# Patient Record
Sex: FEMALE | Race: BLACK OR AFRICAN AMERICAN
[De-identification: names, ages, dates, MRNs, and addresses within clinical notes are randomized per-mention and may not be internally consistent; named-entity substitution may affect disease eponyms.]

---

## 2019-09-12 ENCOUNTER — HOSPITAL ENCOUNTER (EMERGENCY)
Dept: HOSPITAL 92 - ERS | Age: 54
Discharge: HOME | End: 2019-09-12
Payer: SELF-PAY

## 2019-09-12 DIAGNOSIS — Z79.899: ICD-10-CM

## 2019-09-12 DIAGNOSIS — Z86.711: ICD-10-CM

## 2019-09-12 DIAGNOSIS — R07.89: Primary | ICD-10-CM

## 2019-09-12 DIAGNOSIS — I11.0: ICD-10-CM

## 2019-09-12 DIAGNOSIS — I50.9: ICD-10-CM

## 2019-09-12 DIAGNOSIS — F17.210: ICD-10-CM

## 2019-09-12 LAB
ALBUMIN SERPL BCG-MCNC: 3.7 G/DL (ref 3.5–5)
ALP SERPL-CCNC: 73 U/L (ref 40–150)
ALT SERPL W P-5'-P-CCNC: 14 U/L (ref 8–55)
ANION GAP SERPL CALC-SCNC: 9 MMOL/L (ref 10–20)
AST SERPL-CCNC: 10 U/L (ref 5–34)
BASOPHILS # BLD AUTO: 0 THOU/UL (ref 0–0.2)
BASOPHILS NFR BLD AUTO: 0.8 % (ref 0–1)
BILIRUB SERPL-MCNC: 0.6 MG/DL (ref 0.2–1.2)
BUN SERPL-MCNC: 10 MG/DL (ref 9.8–20.1)
CALCIUM SERPL-MCNC: 8.9 MG/DL (ref 7.8–10.44)
CHLORIDE SERPL-SCNC: 100 MMOL/L (ref 98–107)
CO2 SERPL-SCNC: 36 MMOL/L (ref 22–29)
CREAT CL PREDICTED SERPL C-G-VRATE: 0 ML/MIN (ref 70–130)
EOSINOPHIL # BLD AUTO: 0.1 THOU/UL (ref 0–0.7)
EOSINOPHIL NFR BLD AUTO: 3 % (ref 0–10)
GLOBULIN SER CALC-MCNC: 2.3 G/DL (ref 2.4–3.5)
GLUCOSE SERPL-MCNC: 199 MG/DL (ref 70–105)
HGB BLD-MCNC: 15.3 G/DL (ref 12–16)
LIPASE SERPL-CCNC: 17 U/L (ref 8–78)
LYMPHOCYTES # BLD: 0.7 THOU/UL (ref 1.2–3.4)
LYMPHOCYTES NFR BLD AUTO: 14.7 % (ref 21–51)
MCH RBC QN AUTO: 29.2 PG (ref 27–31)
MCV RBC AUTO: 89.9 FL (ref 78–98)
MONOCYTES # BLD AUTO: 0.2 THOU/UL (ref 0.11–0.59)
MONOCYTES NFR BLD AUTO: 4.8 % (ref 0–10)
NEUTROPHILS # BLD AUTO: 3.4 THOU/UL (ref 1.4–6.5)
NEUTROPHILS NFR BLD AUTO: 76.8 % (ref 42–75)
PLATELET # BLD AUTO: 157 THOU/UL (ref 130–400)
POTASSIUM SERPL-SCNC: 4.2 MMOL/L (ref 3.5–5.1)
RBC # BLD AUTO: 5.23 MILL/UL (ref 4.2–5.4)
SODIUM SERPL-SCNC: 141 MMOL/L (ref 136–145)
WBC # BLD AUTO: 4.5 THOU/UL (ref 4.8–10.8)

## 2019-09-12 PROCEDURE — 83690 ASSAY OF LIPASE: CPT

## 2019-09-12 PROCEDURE — 36415 COLL VENOUS BLD VENIPUNCTURE: CPT

## 2019-09-12 PROCEDURE — 71275 CT ANGIOGRAPHY CHEST: CPT

## 2019-09-12 PROCEDURE — 80053 COMPREHEN METABOLIC PANEL: CPT

## 2019-09-12 PROCEDURE — 84484 ASSAY OF TROPONIN QUANT: CPT

## 2019-09-12 PROCEDURE — 93005 ELECTROCARDIOGRAM TRACING: CPT

## 2019-09-12 PROCEDURE — 85025 COMPLETE CBC W/AUTO DIFF WBC: CPT

## 2019-09-12 PROCEDURE — 83880 ASSAY OF NATRIURETIC PEPTIDE: CPT

## 2019-09-12 NOTE — CT
CTA Angio Chest W WO Con



HISTORY: Chest pain and shortness of breath.



COMPARISON: None.



FINDINGS: There are subsegmental atelectatic changes within the lung bases. No confluent infiltrative
 process or pulmonary nodules. Linear atelectasis is also seen in both upper lobes. No pleural

effusions.



The thoracic aorta is normal in caliber.



There is fairly good pulmonary artery opacification there is no CT evidence for pulmonary embolus.



There is no significant mediastinal or hilar adenopathy. The visualized liver parenchyma shows no foc
al findings. A low-attenuation left adrenal mass is again noted. It was seen on a previous 2006

exam most likely an adenoma. Measures approximately 4.6 cm.



IMPRESSION: No CT evidence for pulmonary embolus.



Reported By: Patricio Raines 

Electronically Signed:  9/12/2019 2:07 PM

## 2020-07-15 ENCOUNTER — HOSPITAL ENCOUNTER (INPATIENT)
Dept: HOSPITAL 92 - CCU | Age: 55
LOS: 4 days | Discharge: HOME | DRG: 208 | End: 2020-07-19
Attending: INTERNAL MEDICINE | Admitting: INTERNAL MEDICINE
Payer: COMMERCIAL

## 2020-07-15 VITALS — BODY MASS INDEX: 35.5 KG/M2

## 2020-07-15 DIAGNOSIS — F03.90: ICD-10-CM

## 2020-07-15 DIAGNOSIS — Z90.49: ICD-10-CM

## 2020-07-15 DIAGNOSIS — I08.1: ICD-10-CM

## 2020-07-15 DIAGNOSIS — I25.10: ICD-10-CM

## 2020-07-15 DIAGNOSIS — E11.9: ICD-10-CM

## 2020-07-15 DIAGNOSIS — I16.0: ICD-10-CM

## 2020-07-15 DIAGNOSIS — E87.4: ICD-10-CM

## 2020-07-15 DIAGNOSIS — G89.29: ICD-10-CM

## 2020-07-15 DIAGNOSIS — E87.6: ICD-10-CM

## 2020-07-15 DIAGNOSIS — E78.5: ICD-10-CM

## 2020-07-15 DIAGNOSIS — J96.22: ICD-10-CM

## 2020-07-15 DIAGNOSIS — J96.21: Primary | ICD-10-CM

## 2020-07-15 DIAGNOSIS — J18.9: ICD-10-CM

## 2020-07-15 DIAGNOSIS — J44.1: ICD-10-CM

## 2020-07-15 DIAGNOSIS — Z79.899: ICD-10-CM

## 2020-07-15 DIAGNOSIS — Z88.5: ICD-10-CM

## 2020-07-15 DIAGNOSIS — Z86.14: ICD-10-CM

## 2020-07-15 DIAGNOSIS — Z78.1: ICD-10-CM

## 2020-07-15 DIAGNOSIS — Z90.710: ICD-10-CM

## 2020-07-15 DIAGNOSIS — I50.9: ICD-10-CM

## 2020-07-15 DIAGNOSIS — Z20.828: ICD-10-CM

## 2020-07-15 DIAGNOSIS — J44.0: ICD-10-CM

## 2020-07-15 DIAGNOSIS — I11.0: ICD-10-CM

## 2020-07-15 DIAGNOSIS — E66.2: ICD-10-CM

## 2020-07-15 DIAGNOSIS — F12.10: ICD-10-CM

## 2020-07-15 DIAGNOSIS — F17.210: ICD-10-CM

## 2020-07-15 LAB
ALBUMIN SERPL BCG-MCNC: 3.1 G/DL (ref 3.5–5)
ALP SERPL-CCNC: 61 U/L (ref 40–110)
ALT SERPL W P-5'-P-CCNC: 15 U/L (ref 8–55)
ANALYZER IN CARDIO: (no result)
ANALYZER IN CARDIO: (no result)
ANION GAP SERPL CALC-SCNC: (no result) MMOL/L (ref 10–20)
AST SERPL-CCNC: 25 U/L (ref 5–34)
BASE EXCESS STD BLDA CALC-SCNC: 12.7 MEQ/L
BASE EXCESS STD BLDA CALC-SCNC: 15.1 MEQ/L
BILIRUB SERPL-MCNC: 0.9 MG/DL (ref 0.2–1.2)
BUN SERPL-MCNC: 9 MG/DL (ref 9.8–20.1)
CA-I BLDA-SCNC: 1.13 MMOL/L (ref 1.12–1.3)
CA-I BLDA-SCNC: 1.14 MMOL/L (ref 1.12–1.3)
CALCIUM SERPL-MCNC: 9.1 MG/DL (ref 7.8–10.44)
CHLORIDE SERPL-SCNC: 95 MMOL/L (ref 98–107)
CO2 SERPL-SCNC: (no result) MMOL/L (ref 22–29)
CREAT CL PREDICTED SERPL C-G-VRATE: 146 ML/MIN (ref 70–130)
GLOBULIN SER CALC-MCNC: 3.3 G/DL (ref 2.4–3.5)
GLUCOSE SERPL-MCNC: 151 MG/DL (ref 70–105)
HCO3 BLDA-SCNC: 38.2 MEQ/L (ref 22–28)
HCO3 BLDA-SCNC: 41.8 MEQ/L (ref 22–28)
HCT VFR BLDA CALC: 43 % (ref 36–47)
HCT VFR BLDA CALC: 48 % (ref 36–47)
HGB BLDA-MCNC: 14.6 G/DL (ref 12–16)
HGB BLDA-MCNC: 16.2 G/DL (ref 12–16)
O2 A-A PPRESDIFF RESPIRATORY: 496.73 MM[HG] (ref 0–20)
O2 A-A PPRESDIFF RESPIRATORY: 579.17 MM[HG] (ref 0–20)
PCO2 BLDA: 50.5 MMHG (ref 35–45)
PCO2 BLDA: 58.7 MMHG (ref 35–45)
PH BLDA: 7.47 [PH] (ref 7.35–7.45)
PH BLDA: 7.5 [PH] (ref 7.35–7.45)
PO2 BLDA: 70.7 MMHG (ref 80–100)
PO2 BLDA: 71.6 MMHG (ref 80–100)
POTASSIUM BLD-SCNC: 3.47 MMOL/L (ref 3.7–5.3)
POTASSIUM BLD-SCNC: 3.63 MMOL/L (ref 3.7–5.3)
POTASSIUM SERPL-SCNC: 4.4 MMOL/L (ref 3.5–5.1)
SODIUM SERPL-SCNC: 143 MMOL/L (ref 136–145)
SPECIMEN DRAWN FROM PATIENT: (no result)
SPECIMEN DRAWN FROM PATIENT: (no result)

## 2020-07-15 PROCEDURE — 82805 BLOOD GASES W/O2 SATURATION: CPT

## 2020-07-15 PROCEDURE — 36416 COLLJ CAPILLARY BLOOD SPEC: CPT

## 2020-07-15 PROCEDURE — 71045 X-RAY EXAM CHEST 1 VIEW: CPT

## 2020-07-15 PROCEDURE — 5A1945Z RESPIRATORY VENTILATION, 24-96 CONSECUTIVE HOURS: ICD-10-PCS | Performed by: INTERNAL MEDICINE

## 2020-07-15 PROCEDURE — 85379 FIBRIN DEGRADATION QUANT: CPT

## 2020-07-15 PROCEDURE — 83036 HEMOGLOBIN GLYCOSYLATED A1C: CPT

## 2020-07-15 PROCEDURE — U0003 INFECTIOUS AGENT DETECTION BY NUCLEIC ACID (DNA OR RNA); SEVERE ACUTE RESPIRATORY SYNDROME CORONAVIRUS 2 (SARS-COV-2) (CORONAVIRUS DISEASE [COVID-19]), AMPLIFIED PROBE TECHNIQUE, MAKING USE OF HIGH THROUGHPUT TECHNOLOGIES AS DESCRIBED BY CMS-2020-01-R: HCPCS

## 2020-07-15 PROCEDURE — S0028 INJECTION, FAMOTIDINE, 20 MG: HCPCS

## 2020-07-15 PROCEDURE — 85025 COMPLETE CBC W/AUTO DIFF WBC: CPT

## 2020-07-15 PROCEDURE — 8E0ZXY6 ISOLATION: ICD-10-PCS | Performed by: INTERNAL MEDICINE

## 2020-07-15 PROCEDURE — 87086 URINE CULTURE/COLONY COUNT: CPT

## 2020-07-15 PROCEDURE — 87040 BLOOD CULTURE FOR BACTERIA: CPT

## 2020-07-15 PROCEDURE — 84145 PROCALCITONIN (PCT): CPT

## 2020-07-15 PROCEDURE — 83880 ASSAY OF NATRIURETIC PEPTIDE: CPT

## 2020-07-15 PROCEDURE — 93306 TTE W/DOPPLER COMPLETE: CPT

## 2020-07-15 PROCEDURE — 80048 BASIC METABOLIC PNL TOTAL CA: CPT

## 2020-07-15 PROCEDURE — 94002 VENT MGMT INPAT INIT DAY: CPT

## 2020-07-15 PROCEDURE — 84443 ASSAY THYROID STIM HORMONE: CPT

## 2020-07-15 PROCEDURE — 80053 COMPREHEN METABOLIC PANEL: CPT

## 2020-07-15 PROCEDURE — 87635 SARS-COV-2 COVID-19 AMP PRB: CPT

## 2020-07-15 PROCEDURE — 94003 VENT MGMT INPAT SUBQ DAY: CPT

## 2020-07-15 PROCEDURE — 36415 COLL VENOUS BLD VENIPUNCTURE: CPT

## 2020-07-15 PROCEDURE — 86140 C-REACTIVE PROTEIN: CPT

## 2020-07-15 RX ADMIN — INSULIN LISPRO PRN UNIT: 100 INJECTION, SOLUTION INTRAVENOUS; SUBCUTANEOUS at 10:12

## 2020-07-15 RX ADMIN — INSULIN LISPRO PRN UNIT: 100 INJECTION, SOLUTION INTRAVENOUS; SUBCUTANEOUS at 17:13

## 2020-07-15 RX ADMIN — INSULIN LISPRO PRN UNIT: 100 INJECTION, SOLUTION INTRAVENOUS; SUBCUTANEOUS at 12:17

## 2020-07-15 RX ADMIN — ALBUTEROL SULFATE SCH PUFF: 108 INHALANT RESPIRATORY (INHALATION) at 23:34

## 2020-07-15 RX ADMIN — FAMOTIDINE SCH MG: 10 INJECTION, SOLUTION INTRAVENOUS at 19:31

## 2020-07-15 RX ADMIN — INSULIN LISPRO PRN UNIT: 100 INJECTION, SOLUTION INTRAVENOUS; SUBCUTANEOUS at 23:06

## 2020-07-15 RX ADMIN — ALBUTEROL SULFATE SCH PUFF: 108 INHALANT RESPIRATORY (INHALATION) at 22:11

## 2020-07-15 RX ADMIN — INSULIN LISPRO PRN UNIT: 100 INJECTION, SOLUTION INTRAVENOUS; SUBCUTANEOUS at 06:09

## 2020-07-15 RX ADMIN — AZITHROMYCIN SCH MLS: 500 INJECTION, POWDER, LYOPHILIZED, FOR SOLUTION INTRAVENOUS at 21:29

## 2020-07-15 RX ADMIN — CEFTRIAXONE SCH MLS: 2 INJECTION, POWDER, FOR SOLUTION INTRAMUSCULAR; INTRAVENOUS at 20:12

## 2020-07-15 RX ADMIN — FAMOTIDINE SCH MG: 10 INJECTION, SOLUTION INTRAVENOUS at 10:15

## 2020-07-15 NOTE — PDOC.HHP
Hospitalist HPI





- History of Present Illness


respiratoty distress


History of Present Illness: 


Most of the history was obtain from transferring physicians and EMR records. 

During my evaluation patient sedated and on mechanical ventilation. no family 

members were present at the moment of my evaluation





Case of an 53y/o female with pmhx of copd, dm, cad, chf, and htn  who was 

transferred from [facility] due to respiratory failure requiring mechanical 

ventilation. apparently patient was on her usual state of health until 

yesterday when she began with increasing dyspnea and cough, she reports using 

home therapies w/o resolution of her symptoms for which she decided to come to 

hospital for evaluation. patient arrived with an 02 sat of 73% in RA and was 

placed on bipap. abgs showed acute on chronic hypoxic and hypercapnic 

respiratory failure for which patient was placed on bipap. f/u abgs showed 

worsening of her respiratory status for which patient was placed on mechanical 

ventilation and transferred here for further evaluation and management. further 

work up showed b/l pneumonia concerning for viral pneumonia 








Hospitalist ROS





- Review of Systems


ROS unobtainable: due to endotracheal tube





Hospitalist History





- Past Surgical History


Past Surgical History: reports: Cholecystectomy, Hysterectomy





- Family History


Other Family History: 





unable to asses due to MV





- Social History


Smoking Status: Current every day smoker


Alcohol: reports: Occassional


Drugs: reports: marijuana


Living Situation: With Family





- Exam


General - other findings: sedated on MV


Eye: PERRL, anicteric sclera


ENT: normocephalic atraumatic, no oropharyngeal lesions


Neck: supple, symmetric, no JVD


Heart: RRR, no murmur, no gallops


Respiratory: CTAB, no wheezes, no rales


Gastrointestinal: soft, non-tender, non-distended


Extremities: no cyanosis, no clubbing, no edema


Skin: normal turgor, no lesions, no rashes


Neurological: cranial nerve grossly intact


Musculoskeletal: normal tone, no muscle wasting


Psychiatric - other findings: sedated





Hospitalist Results





- Labs


Lab results: 


 











ABG pH  7.50  (7.35-7.45)  H  07/15/20  03:20    


 


ABG pCO2  50.5 mmHg (35.0-45.0)  H  07/15/20  03:20    


 


ABG pO2  70.7 mmHg (80.0-100.0)  L  07/15/20  03:20    














- Radiology Interpretation


  ** Chest x-ray


Additional Comment: 





b/l pneumonia 





Hospitalist H&P A/P





- Problem


(1) Respiratory failure with hypoxia and hypercapnia


Code(s): J96.91 - RESPIRATORY FAILURE, UNSPECIFIED WITH HYPOXIA; J96.92 - 

RESPIRATORY FAILURE, UNSPECIFIED WITH HYPERCAPNIA   Status: Acute   





(2) Pneumonia


Code(s): J18.9 - PNEUMONIA, UNSPECIFIED ORGANISM   Status: Acute   





(3) COVID-19


Code(s): U07.1 - COVID-19   Status: Acute   





(4) COPD exacerbation


Code(s): J44.1 - CHRONIC OBSTRUCTIVE PULMONARY DISEASE W (ACUTE) EXACERBATION   

Status: Acute   





(5) HTN (hypertension)


Code(s): I10 - ESSENTIAL (PRIMARY) HYPERTENSION   Status: Acute   





(6) CHF (congestive heart failure)


Code(s): I50.9 - HEART FAILURE, UNSPECIFIED   Status: Acute   





(7) CAD (coronary artery disease)


Code(s): I25.10 - ATHSCL HEART DISEASE OF NATIVE CORONARY ARTERY W/O ANG PCTRS 

  Status: Acute   





(8) Diabetes


Code(s): E11.9 - TYPE 2 DIABETES MELLITUS WITHOUT COMPLICATIONS   Status: Acute

   





(9) Hypertensive urgency


Code(s): I16.0 - HYPERTENSIVE URGENCY   Status: Acute   





- Plan


Plan: 


53y/o female with the stated pmhx who presented with hypoxic and hypercapnic 

respiratory failure requiring MV in CS, transfer to further management. 





acute on chronic hypoxic and hypercapnic respiratory failure


- on MV f/u abgs


- pulmo / crit consulted 


- sedation protocol





pneumonia / covid 19 / copd exacerbation


- swab ordered


- isolatin protocol started


- on rocephin and azithromycin


- dexamethazone iv dailiy


- cxr w b/l pneumonia concerning for viral pneumonia


- has classic covid labs with lymphopenia


- will send inflammation marker


- ivfs


- f/u blood / urine cultures 





hypertensive urgency


- on cardene  drip due to systolic b/p in the 200s, transition to oral 

medicaion when possible 





DM


- accu checks and ss


- npo





htn / hyperlipidemia / cad / chf


- continue home meds when possible

## 2020-07-15 NOTE — CON
DATE OF CONSULTATION:  



HISTORY OF PRESENT ILLNESS:  A 54-year-old female, 90 kg, presented to the ER in

[place] with blood pressure 178/123, 56% sats on room air, pulse __________

severe respiratory distress.  She was eventually intubated and was transferred to

[facility]. 



She is presently sedated, unable to get additional information; but past medical

history is pertinent for previous back surgeries; MRSA infection, requiring

prolonged antibiotics, along with endocarditis; history of hypertension; pulmonary

emboli; COPD; tobacco abuse; and congestive heart failure. 



PAST SURGICAL HISTORY:  Hysterectomy, back surgery, and cholecystectomy.



SOCIAL HISTORY:  Alcohol, minimal.  Smokes marijuana, tobacco.



HOME MEDICATIONS:  Apparently include:

1. Metoprolol 100.

2. Spironolactone 25.



ALLERGIES:  CODEINE.



REVIEW OF SYSTEMS:  Unobtainable.



PHYSICAL EXAMINATION:

GENERAL:  She is sedated. 

VITAL SIGNS:  Blood pressure 136/96, pulse 109, sats 95%, respiratory rate 18. 

CHEST:  Rhonchi and crackles. 

CARDIAC:  Normal S1, S2.  No gallops. 

ABDOMEN:  No masses.



LABORATORY DATA:  White count was 6000, H and H 15 and 49, platelet count was

normal.  PO2 showed 71, pCO2 of 58, pH 7.47, bicarb was 41.  Lytes were normal. 



BNP was 292. 



X-ray shows diffuse pulmonary infiltrates, scoliosis.



IMPRESSION:  

1. Respiratory failure, pneumonia versus congestive heart failure.

2. Tobacco, history of marijuana abuse.

3. Chronic issues with back.



PLAN:  Awaiting serology for coronavirus.  Otherwise, agree with broad-spectrum

antibiotics, neb treatments, steroids, supportive care.  I ordered echo, DVT

prophylaxis, swab test, etc. 



45 minutes of critical care time.







Job ID:  [id number]

## 2020-07-16 LAB
ALBUMIN SERPL BCG-MCNC: 3.1 G/DL (ref 3.5–5)
ALP SERPL-CCNC: 57 U/L (ref 40–110)
ALT SERPL W P-5'-P-CCNC: 11 U/L (ref 8–55)
ANALYZER IN CARDIO: (no result)
ANION GAP SERPL CALC-SCNC: 18 MMOL/L (ref 10–20)
AST SERPL-CCNC: 23 U/L (ref 5–34)
BASE EXCESS STD BLDA CALC-SCNC: 12 MEQ/L
BASOPHILS # BLD AUTO: 0 THOU/UL (ref 0–0.2)
BASOPHILS NFR BLD AUTO: 0 % (ref 0–1)
BILIRUB SERPL-MCNC: 0.9 MG/DL (ref 0.2–1.2)
BUN SERPL-MCNC: 12 MG/DL (ref 9.8–20.1)
CA-I BLDA-SCNC: 1.16 MMOL/L (ref 1.12–1.3)
CALCIUM SERPL-MCNC: 8.9 MG/DL (ref 7.8–10.44)
CHLORIDE SERPL-SCNC: 96 MMOL/L (ref 98–107)
CO2 SERPL-SCNC: 32 MMOL/L (ref 22–29)
CREAT CL PREDICTED SERPL C-G-VRATE: 148 ML/MIN (ref 70–130)
CRP SERPL-MCNC: 3.95 MG/DL
EOSINOPHIL # BLD AUTO: 0 THOU/UL (ref 0–0.7)
EOSINOPHIL NFR BLD AUTO: 0.4 % (ref 0–10)
GLOBULIN SER CALC-MCNC: 3.2 G/DL (ref 2.4–3.5)
GLUCOSE SERPL-MCNC: 174 MG/DL (ref 70–105)
HCO3 BLDA-SCNC: 35.7 MEQ/L (ref 22–28)
HCT VFR BLDA CALC: 49 % (ref 36–47)
HGB BLD-MCNC: 15.6 G/DL (ref 12–16)
HGB BLDA-MCNC: 16.5 G/DL (ref 12–16)
LYMPHOCYTES # BLD: 0.3 THOU/UL (ref 1.2–3.4)
LYMPHOCYTES NFR BLD AUTO: 5.6 % (ref 21–51)
MCH RBC QN AUTO: 29 PG (ref 27–31)
MCV RBC AUTO: 91.5 FL (ref 78–98)
MONOCYTES # BLD AUTO: 0.3 THOU/UL (ref 0.11–0.59)
MONOCYTES NFR BLD AUTO: 4.8 % (ref 0–10)
NEUTROPHILS # BLD AUTO: 5.3 THOU/UL (ref 1.4–6.5)
NEUTROPHILS NFR BLD AUTO: 89.2 % (ref 42–75)
O2 A-A PPRESDIFF RESPIRATORY: 383.88 MM[HG] (ref 0–20)
PCO2 BLDA: 41.7 MMHG (ref 35–45)
PH BLDA: 7.55 [PH] (ref 7.35–7.45)
PLATELET # BLD AUTO: 170 THOU/UL (ref 130–400)
PO2 BLDA: 63.1 MMHG (ref 80–100)
POTASSIUM BLD-SCNC: 3.32 MMOL/L (ref 3.7–5.3)
POTASSIUM SERPL-SCNC: 4 MMOL/L (ref 3.5–5.1)
RBC # BLD AUTO: 5.4 MILL/UL (ref 4.2–5.4)
SODIUM SERPL-SCNC: 142 MMOL/L (ref 136–145)
SPECIMEN DRAWN FROM PATIENT: (no result)
WBC # BLD AUTO: 5.9 THOU/UL (ref 4.8–10.8)

## 2020-07-16 RX ADMIN — CEFTRIAXONE SCH MLS: 2 INJECTION, POWDER, FOR SOLUTION INTRAMUSCULAR; INTRAVENOUS at 22:17

## 2020-07-16 RX ADMIN — INSULIN LISPRO PRN UNIT: 100 INJECTION, SOLUTION INTRAVENOUS; SUBCUTANEOUS at 14:48

## 2020-07-16 RX ADMIN — FAMOTIDINE SCH MG: 10 INJECTION, SOLUTION INTRAVENOUS at 22:18

## 2020-07-16 RX ADMIN — ALBUTEROL SULFATE SCH PUFF: 108 INHALANT RESPIRATORY (INHALATION) at 19:16

## 2020-07-16 RX ADMIN — ALBUTEROL SULFATE SCH PUFF: 108 INHALANT RESPIRATORY (INHALATION) at 14:02

## 2020-07-16 RX ADMIN — ALBUTEROL SULFATE SCH PUFF: 108 INHALANT RESPIRATORY (INHALATION) at 23:28

## 2020-07-16 RX ADMIN — INSULIN LISPRO PRN UNIT: 100 INJECTION, SOLUTION INTRAVENOUS; SUBCUTANEOUS at 03:14

## 2020-07-16 RX ADMIN — FAMOTIDINE SCH MG: 10 INJECTION, SOLUTION INTRAVENOUS at 09:54

## 2020-07-16 RX ADMIN — ALBUTEROL SULFATE SCH PUFF: 108 INHALANT RESPIRATORY (INHALATION) at 07:24

## 2020-07-16 RX ADMIN — AZITHROMYCIN SCH MLS: 500 INJECTION, POWDER, LYOPHILIZED, FOR SOLUTION INTRAVENOUS at 22:18

## 2020-07-16 NOTE — RAD
XR Chest 1 View Portable



History: Ventilated patient



Comparison: Radiograph 2 days prior



Findings: Patient is intubated with endotracheal tube tip above the kassie 2.2 cm. Airspace opacities
 are similar. Dense left lower lobe consolidation.



Enteric tube tip below diaphragm although out of field of view.



Effusions are similar.



Impression: No significant interval improvement in lung aeration.



Reported By: Luis Enrique Anthony 

Electronically Signed:  7/16/2020 7:49 AM

## 2020-07-16 NOTE — PRG
DATE OF SERVICE:  07/16/2020



SUBJECTIVE:  Kristina Ortiz is a 54-year-old female who was still on the vent,

low-dose Diprivan, more responsive, awake.  Moves both lower extremities. 



OBJECTIVE:  VITAL SIGNS:  Pulse 70, blood pressure 168/100, saturations are 94% on

70%, PEEP of 10.  I's and O's have been negative. 

CHEST:  Decreased breath sounds without any wheezing, but bilateral rhonchi. 

CARDIAC:  Normal S1, S2.  No gallops. 

ABDOMEN:  No masses.



LABORATORY DATA:  White count 5000, H and H normal, platelet count normal.  PO2 is

63, pCO2 45%, pH 7.55.  Bicarb was 32. 



Creatinine is normal.  C-reactive protein is 30.95.



ASSESSMENT:  Respiratory failure, morbid obesity, congestive heart failure, probably

underlying sleep apnea. 



We will start nutrition.  Await results of the echo.  Input from Cardiology.

Started low-dose Diamox.  We will start weaning in the next 24 to 48 hours.  In the

meantime, continue antibiotics. 



One-half hour of critical care time.







Job ID:  [id number]

## 2020-07-17 LAB
ANALYZER IN CARDIO: (no result)
ANION GAP SERPL CALC-SCNC: 10 MMOL/L (ref 10–20)
BASE EXCESS STD BLDA CALC-SCNC: 3.4 MEQ/L
BASOPHILS # BLD AUTO: 0 THOU/UL (ref 0–0.2)
BASOPHILS NFR BLD AUTO: 0.4 % (ref 0–1)
BUN SERPL-MCNC: 14 MG/DL (ref 9.8–20.1)
CA-I BLDA-SCNC: 1.21 MMOL/L (ref 1.12–1.3)
CALCIUM SERPL-MCNC: 8.4 MG/DL (ref 7.8–10.44)
CHLORIDE SERPL-SCNC: 99 MMOL/L (ref 98–107)
CO2 SERPL-SCNC: 34 MMOL/L (ref 22–29)
CREAT CL PREDICTED SERPL C-G-VRATE: 172 ML/MIN (ref 70–130)
EOSINOPHIL # BLD AUTO: 0 THOU/UL (ref 0–0.7)
EOSINOPHIL NFR BLD AUTO: 0.3 % (ref 0–10)
GLUCOSE SERPL-MCNC: 163 MG/DL (ref 70–105)
HCO3 BLDA-SCNC: 30.9 MEQ/L (ref 22–28)
HCT VFR BLDA CALC: 46 % (ref 36–47)
HGB BLD-MCNC: 14.6 G/DL (ref 12–16)
HGB BLDA-MCNC: 15.7 G/DL (ref 12–16)
LYMPHOCYTES # BLD: 0.3 THOU/UL (ref 1.2–3.4)
LYMPHOCYTES NFR BLD AUTO: 5.5 % (ref 21–51)
MCH RBC QN AUTO: 28.9 PG (ref 27–31)
MCV RBC AUTO: 90.5 FL (ref 78–98)
MONOCYTES # BLD AUTO: 0.3 THOU/UL (ref 0.11–0.59)
MONOCYTES NFR BLD AUTO: 5.3 % (ref 0–10)
NEUTROPHILS # BLD AUTO: 4.4 THOU/UL (ref 1.4–6.5)
NEUTROPHILS NFR BLD AUTO: 88.5 % (ref 42–75)
O2 A-A PPRESDIFF RESPIRATORY: 266.98 MM[HG] (ref 0–20)
PCO2 BLDA: 58.1 MMHG (ref 35–45)
PH BLDA: 7.34 [PH] (ref 7.35–7.45)
PLATELET # BLD AUTO: 173 THOU/UL (ref 130–400)
PO2 BLDA: 88.2 MMHG (ref 80–100)
POTASSIUM BLD-SCNC: 3.95 MMOL/L (ref 3.7–5.3)
POTASSIUM SERPL-SCNC: 3.9 MMOL/L (ref 3.5–5.1)
RBC # BLD AUTO: 5.03 MILL/UL (ref 4.2–5.4)
SODIUM SERPL-SCNC: 139 MMOL/L (ref 136–145)
SPECIMEN DRAWN FROM PATIENT: (no result)
WBC # BLD AUTO: 5 THOU/UL (ref 4.8–10.8)

## 2020-07-17 RX ADMIN — ALBUTEROL SULFATE SCH: 108 INHALANT RESPIRATORY (INHALATION) at 19:23

## 2020-07-17 RX ADMIN — INSULIN LISPRO PRN UNIT: 100 INJECTION, SOLUTION INTRAVENOUS; SUBCUTANEOUS at 12:07

## 2020-07-17 RX ADMIN — CEFTRIAXONE SCH MLS: 2 INJECTION, POWDER, FOR SOLUTION INTRAMUSCULAR; INTRAVENOUS at 20:20

## 2020-07-17 RX ADMIN — ALBUTEROL SULFATE SCH PUFF: 108 INHALANT RESPIRATORY (INHALATION) at 08:11

## 2020-07-17 RX ADMIN — AZITHROMYCIN SCH MLS: 500 INJECTION, POWDER, LYOPHILIZED, FOR SOLUTION INTRAVENOUS at 21:51

## 2020-07-17 RX ADMIN — FAMOTIDINE SCH MG: 10 INJECTION, SOLUTION INTRAVENOUS at 20:21

## 2020-07-17 RX ADMIN — INSULIN LISPRO PRN UNIT: 100 INJECTION, SOLUTION INTRAVENOUS; SUBCUTANEOUS at 18:24

## 2020-07-17 RX ADMIN — FAMOTIDINE SCH MG: 10 INJECTION, SOLUTION INTRAVENOUS at 08:33

## 2020-07-17 RX ADMIN — ALBUTEROL SULFATE SCH: 108 INHALANT RESPIRATORY (INHALATION) at 15:57

## 2020-07-17 NOTE — PRG
DATE OF SERVICE:  



SUBJECTIVE:  Kristina Ortiz is a 54-year-old demented patient, who this morning, is

ventilator sedated. 



OBJECTIVE:  VITAL SIGNS:  Temperature __________, respiratory rate __________, pulse

80, blood pressure 123/80.  I's and O's have been consistently negative. 

CHEST:  Decreased breath sounds.  No wheezing. 

CARDIAC:  Normal S1, S2.  No gallops. 

ABDOMEN:  Soft.



IMPRESSION:  Respiratory failure, morbid obesity, diastolic dysfunction, chronic

pain, dementia, x-ray looks much improved. 



Echo showed surprisingly normal ejection fraction. 



Thyroid function not done. 



You can hold sedation and consider weaning and extubation. 



One-half hour of critical time.







Job ID:  [id number]

## 2020-07-17 NOTE — PDOC.HOSPP
- Subjective


Encounter Date: 07/17/20


Encounter Time: 11:00


Subjective: 





pt intubated but awake and moves all ext





- Objective


Vital Signs & Weight: 


 Vital Signs (12 hours)











  Temp Pulse Resp Pulse Ox


 


 07/17/20 16:00     98


 


 07/17/20 15:25   84  24 H  95


 


 07/17/20 14:00    8 L 


 


 07/17/20 12:00  97.7 F   8 L 


 


 07/17/20 10:43   69  


 


 07/17/20 10:00    8 L 


 


 07/17/20 08:11   62  


 


 07/17/20 08:00  97.6 F   8 L  92 L


 


 07/17/20 07:57    8 L 








 Weight











Admit Weight                   237 lb 7.005 oz


 


Weight                         233 lb 14.567 oz











 Most Recent Monitor Data











Heart Rate from ECG            88


 


NIBP                           169/108


 


NIBP BP-Mean                   128


 


Respiration from ECG           26


 


SpO2                           93














I&O: 


 











 07/16/20 07/17/20 07/18/20





 06:59 06:59 06:59


 


Intake Total 2099 1742 1042


 


Output Total 2955 1850 725


 


Balance -856 -108 317











Result Diagrams: 


 07/17/20 03:30





 07/17/20 03:30


Additional Labs: 


 Accuchecks











  07/17/20 07/17/20 07/17/20





  18:19 12:11 00:42


 


POC Glucose  227 H  204 H  176 H














  07/16/20





  18:05


 


POC Glucose  171 H














Hospitalist ROS





- Review of Systems


Other: 





unable to obtain





- Medication


Medications: 


Active Medications











Generic Name Dose Route Start Last Admin





  Trade Name Bradyq  PRN Reason Stop Dose Admin


 


Acetazolamide Sodium  500 mg  07/16/20 09:00  07/17/20 08:33





  Diamox  IVP  07/19/20 09:01  500 mg





  DAILY JAYLA   Administration





     





     





     





     


 


Albuterol Sulfate  2 puff  07/15/20 19:00  07/17/20 15:57





  Proventil Hfa  INH   Not Given





  [id number] JAYLA   





     





     





     





     


 


Enoxaparin Sodium  40 mg  07/15/20 09:00  07/17/20 08:34





  Lovenox  SC   40 mg





  0900,2100 JAYLA   Administration





     





     





     





     


 


Famotidine  20 mg  07/15/20 09:00  07/17/20 08:33





  Pepcid  SLOW IVP   20 mg





  BID JAYLA   Administration





     





     





     





     


 


Azithromycin 500 mg/ Sodium  250 mls @ 250 mls/hr  07/15/20 22:00  07/16/20 22:

18





  Chloride  IVPB   250 mls





  Q24HR JAYLA   Administration





     





     





     





     


 


Ceftriaxone Sodium 2 gm/  100 mls @ 200 mls/hr  07/15/20 21:00  07/16/20 22:17





  Sodium Chloride  IVPB   100 mls





  Q24HR JAYLA   Administration





     





     





     





     


 


Ascorbic Acid 1,500 mg/ Sodium  53 mls @ 100 mls/hr  07/15/20 12:00  07/17/20 18

:23





  Chloride  IVPB  07/19/20 06:32  53 mls





  Q6HR JAYLA   Administration





     





     





     





     


 


Thiamine HCl 200 mg/ Sodium  52 mls @ 100 mls/hr  07/15/20 09:00  07/17/20 08:33





  Chloride  IVPB  07/19/20 09:01  52 mls





  Q12HR JAYLA   Administration





     





     





     





     


 


Insulin Human Lispro  0 units  07/15/20 03:56  07/17/20 18:24





  Humalog  SC   3 unit





  .MILD SLIDING SCALE PRN   Administration





  Mild Correctional Scale   





     





     





     


 


Methylprednisolone Sodium Succinate  40 mg  07/15/20 12:00  07/17/20 18:19





  Solu-Medrol  IVP   40 mg





  Q6HR JAYLA   Administration





     





     





     





     


 


Sterile Water  10 ml  07/16/20 09:00  07/17/20 08:34





  Water For Injection  IVP  07/19/20 09:01  10 ml





  DAILY JAYLA   Administration





     





     





     





     














- Exam


Heart: negative: RRR, no murmur, no gallops, no rubs, normal peripheral pulses, 

irregular, diminshed peripheral pulses, murmur present, II/IV, III/IV


Respiratory: rhonchi


Gastrointestinal: negative: soft, non-tender, non-distended, normal bowel sounds

, no palpable masses, no hepatomegaly, no splenomegaly, no bruit, no guarding, 

no rigidity, tender to palpation, distended, diminished bowl sounds, voluntary 

guarding


Extremities: 1+ LE edema


Neurological - other findings: pt awake





Hosp A/P


(1) MRSA bacteremia


Code(s): R78.81 - BACTEREMIA; B95.62 - METHICILLIN RESIS STAPH INFCT CAUSING 

DISEASES CLASSD ELSWHR   Status: Acute   





(2) CAD (coronary artery disease)


Code(s): I25.10 - ATHSCL HEART DISEASE OF NATIVE CORONARY ARTERY W/O ANG PCTRS 

  Status: Acute   





(3) Diabetes


Code(s): E11.9 - TYPE 2 DIABETES MELLITUS WITHOUT COMPLICATIONS   Status: Acute

   





(4) Respiratory failure with hypoxia and hypercapnia


Code(s): J96.91 - RESPIRATORY FAILURE, UNSPECIFIED WITH HYPOXIA; J96.92 - 

RESPIRATORY FAILURE, UNSPECIFIED WITH HYPERCAPNIA   Status: Acute   





- Plan





spoke with pt's  who did not know much except that she was sob and he 

brought her to the hospital. covid negative.  BNP not too elevated. she is a 

smoker possible copd exab.  did say that he thinks her oxygen is not 

working well at home. He also states that she stays at home.   she has a hx of 

mrsa bactremia and per records has a hx of drug use. she has a flail chordae 

but her MR is mild. I spoke with cardiology about her echo. if her blood cx 

becomes positive or she has a fever she will need a SUZANNE.

## 2020-07-17 NOTE — RAD
XR Chest 1 View Portable



History: Ventilated patient



Comparison: Radiograph prior day



Findings: Endotracheal tube tip sits above the kassie 3.2 cm. Enteric tube tip below diaphragm althou
gh out of field of view.



Moderate effusions are similar. The focal airspace opacities are similar. No acute osseous abnormalit
y.



Impression: No significant interval change in the radiographic appearance of the chest.



Reported By: Luis Enrique Anthony 

Electronically Signed:  7/17/2020 7:53 AM

## 2020-07-18 LAB
ANION GAP SERPL CALC-SCNC: 12 MMOL/L (ref 10–20)
BASOPHILS # BLD AUTO: 0 THOU/UL (ref 0–0.2)
BASOPHILS NFR BLD AUTO: 0.6 % (ref 0–1)
BUN SERPL-MCNC: 15 MG/DL (ref 9.8–20.1)
CALCIUM SERPL-MCNC: 9.1 MG/DL (ref 7.8–10.44)
CHLORIDE SERPL-SCNC: 102 MMOL/L (ref 98–107)
CO2 SERPL-SCNC: 30 MMOL/L (ref 22–29)
CREAT CL PREDICTED SERPL C-G-VRATE: 157 ML/MIN (ref 70–130)
EOSINOPHIL # BLD AUTO: 0 THOU/UL (ref 0–0.7)
EOSINOPHIL NFR BLD AUTO: 0.4 % (ref 0–10)
GLUCOSE SERPL-MCNC: 225 MG/DL (ref 70–105)
HGB BLD-MCNC: 16.4 G/DL (ref 12–16)
LYMPHOCYTES # BLD: 0.2 THOU/UL (ref 1.2–3.4)
LYMPHOCYTES NFR BLD AUTO: 2.3 % (ref 21–51)
MCH RBC QN AUTO: 29.1 PG (ref 27–31)
MCV RBC AUTO: 91.1 FL (ref 78–98)
MONOCYTES # BLD AUTO: 0.4 THOU/UL (ref 0.11–0.59)
MONOCYTES NFR BLD AUTO: 4.7 % (ref 0–10)
NEUTROPHILS # BLD AUTO: 7.4 THOU/UL (ref 1.4–6.5)
NEUTROPHILS NFR BLD AUTO: 92.1 % (ref 42–75)
PLATELET # BLD AUTO: 183 THOU/UL (ref 130–400)
POTASSIUM SERPL-SCNC: 3.6 MMOL/L (ref 3.5–5.1)
RBC # BLD AUTO: 5.64 MILL/UL (ref 4.2–5.4)
SODIUM SERPL-SCNC: 140 MMOL/L (ref 136–145)
WBC # BLD AUTO: 8 THOU/UL (ref 4.8–10.8)

## 2020-07-18 RX ADMIN — INSULIN GLARGINE SCH MLS: 100 INJECTION, SOLUTION SUBCUTANEOUS at 09:20

## 2020-07-18 RX ADMIN — ALBUTEROL SULFATE SCH: 108 INHALANT RESPIRATORY (INHALATION) at 00:07

## 2020-07-18 RX ADMIN — MULTIPLE VITAMINS W/ MINERALS TAB SCH TAB: TAB at 08:00

## 2020-07-18 RX ADMIN — FAMOTIDINE SCH MG: 10 INJECTION, SOLUTION INTRAVENOUS at 21:14

## 2020-07-18 RX ADMIN — INSULIN LISPRO PRN UNIT: 100 INJECTION, SOLUTION INTRAVENOUS; SUBCUTANEOUS at 09:20

## 2020-07-18 RX ADMIN — INSULIN LISPRO PRN UNIT: 100 INJECTION, SOLUTION INTRAVENOUS; SUBCUTANEOUS at 00:51

## 2020-07-18 RX ADMIN — INSULIN LISPRO PRN UNIT: 100 INJECTION, SOLUTION INTRAVENOUS; SUBCUTANEOUS at 12:08

## 2020-07-18 RX ADMIN — FAMOTIDINE SCH MG: 10 INJECTION, SOLUTION INTRAVENOUS at 07:59

## 2020-07-18 RX ADMIN — INSULIN LISPRO PRN UNIT: 100 INJECTION, SOLUTION INTRAVENOUS; SUBCUTANEOUS at 05:12

## 2020-07-18 RX ADMIN — ALBUTEROL SULFATE SCH: 108 INHALANT RESPIRATORY (INHALATION) at 06:31

## 2020-07-18 RX ADMIN — ALBUTEROL SULFATE SCH: 108 INHALANT RESPIRATORY (INHALATION) at 13:37

## 2020-07-18 NOTE — PDOC.HOSPP
- Subjective


Encounter Date: 07/18/20


Encounter Time: 11:15


Subjective: 





pt up in bed no complains





- Objective


Vital Signs & Weight: 


 Vital Signs (12 hours)











  Temp Pulse Resp BP BP BP Pulse Ox


 


 07/18/20 16:41  98.2 F  84  20   116/77   94 L


 


 07/18/20 14:20  98 F  82  18    137/67  95


 


 07/18/20 13:40   100  30 H     94 L


 


 07/18/20 12:00  98.4 F      


 


 07/18/20 08:02     151/108 H   


 


 07/18/20 08:00  98.2 F  96   151/108 H    97


 


 07/18/20 06:33   96  21 H     95








 Weight











Admit Weight                   237 lb 7.005 oz


 


Weight                         233 lb 3.985 oz











 Most Recent Monitor Data











Heart Rate from ECG            102


 


NIBP                           136/94


 


NIBP BP-Mean                   108


 


Respiration from ECG           31


 


SpO2                           94














I&O: 


 











 07/17/20 07/18/20 07/19/20





 06:59 06:59 06:59


 


Intake Total 1742 3242 900


 


Output Total 1850 2160 900


 


Balance -108 1082 0











Result Diagrams: 


 07/18/20 03:19





 07/18/20 03:19


Additional Labs: 


 Accuchecks











  07/18/20 07/18/20 07/18/20





  11:53 08:53 05:15


 


POC Glucose  297 H  203 H  234 H














  07/18/20 07/17/20





  00:55 18:19


 


POC Glucose  255 H  227 H














Hospitalist ROS





- Review of Systems


Cardiovascular: denies: chest pain, palpitations, orthopnea, paroxysmal noc. 

dyspnea, edema, light headedness, other


Gastrointestinal: denies: nausea, vomiting, abdominal pain, diarrhea, 

constipation, melena, hematochezia, other


Genitourinary: denies: dysuria, frequency, incontinence, hematuria, retention, 

other





- Medication


Medications: 


Active Medications











Generic Name Dose Route Start Last Admin





  Trade Name Freq  PRN Reason Stop Dose Admin


 


Acetazolamide Sodium  500 mg  07/16/20 09:00  07/18/20 07:59





  Diamox  IVP  07/19/20 09:01  500 mg





  DAILY JAYLA   Administration





     





     





     





     


 


Albuterol/Ipratropium  3 ml  07/17/20 19:00  07/18/20 13:40





  Duoneb  NEB   3 ml





  [id number] JAYLA   Administration





     





     





     





     


 


Amlodipine Besylate  10 mg  07/18/20 09:00  07/18/20 08:00





  Norvasc  PO   10 mg





  DAILY JAYLA   Administration





     





     





     





     


 


Docusate Sodium  100 mg  07/18/20 09:00  07/18/20 08:00





  Colace  PO   100 mg





  BID JAYLA   Administration





     





     





     





     


 


Enoxaparin Sodium  40 mg  07/15/20 09:00  07/18/20 07:59





  Lovenox  SC   40 mg





  0900,2100 JAYLA   Administration





     





     





     





     


 


Famotidine  20 mg  07/15/20 09:00  07/18/20 07:59





  Pepcid  SLOW IVP   20 mg





  BID JAYLA   Administration





     





     





     





     


 


Folic Acid  1 mg  07/18/20 09:00  07/18/20 08:00





  Folvite  PO   1 mg





  DAILY JAYLA   Administration





     





     





     





     


 


Hydralazine HCl  10 mg  07/17/20 22:21  07/18/20 04:35





  Apresoline  SLOW IVP   10 mg





  Q4H PRN   Administration





  SBP > 180 and HR < 70   





     





     





     


 


Hydrochlorothiazide  25 mg  07/18/20 09:00  07/18/20 08:00





  Hydrochlorothiazide  PO   25 mg





  DAILY JAYLA   Administration





     





     





     





     


 


Insulin Glargine 8 units/  0.08 mls @ 0 mls/hr  07/18/20 09:00  07/18/20 09:20





  Miscellaneous Medication  SC   0.08 mls





  QAM JAYLA   Administration





     





     





     





     


 


Insulin Human Lispro  0 units  07/15/20 03:56  07/18/20 12:08





  Humalog  SC   3 unit





  .MILD SLIDING SCALE PRN   Administration





  Mild Correctional Scale   





     





     





     


 


Iron/Minerals/Multivitamins  1 tab  07/18/20 09:00  07/18/20 08:00





  Theragran M  PO   1 tab





  DAILY JAYLA   Administration





     





     





     





     


 


Lisinopril  20 mg  07/18/20 09:00  07/18/20 08:02





  Zestril  PO   20 mg





  BID JAYLA   Administration





     





     





     





     


 


Lorazepam  1 mg  07/18/20 09:00  07/18/20 08:00





  Ativan  PO   1 mg





  BID JAYLA   Administration





     





     





     





     


 


Sterile Water  10 ml  07/16/20 09:00  07/18/20 07:59





  Water For Injection  IVP  07/19/20 09:01  10 ml





  DAILY JAYLA   Administration





     





     





     





     














- Exam


Neck: negative: supple, symmetric, no JVD, no thyromegaly, no lymphadenopathy, 

no carotid bruit, JVD


Heart: negative: RRR, no murmur, no gallops, no rubs, normal peripheral pulses, 

irregular, diminshed peripheral pulses, murmur present, II/IV, III/IV


Respiratory: rhonchi


Gastrointestinal: negative: soft, non-tender, non-distended, normal bowel sounds

, no palpable masses, no hepatomegaly, no splenomegaly, no bruit, no guarding, 

no rigidity, tender to palpation, distended, diminished bowl sounds, voluntary 

guarding





Hosp A/P


(1) MRSA bacteremia


Code(s): R78.81 - BACTEREMIA; B95.62 - METHICILLIN RESIS STAPH INFCT CAUSING 

DISEASES CLASSD ELSWHR   Status: Acute   





(2) CAD (coronary artery disease)


Code(s): I25.10 - ATHSCL HEART DISEASE OF NATIVE CORONARY ARTERY W/O ANG PCTRS 

  Status: Acute   





(3) Diabetes


Code(s): E11.9 - TYPE 2 DIABETES MELLITUS WITHOUT COMPLICATIONS   Status: Acute

   





(4) Respiratory failure with hypoxia and hypercapnia


Code(s): J96.91 - RESPIRATORY FAILURE, UNSPECIFIED WITH HYPOXIA; J96.92 - 

RESPIRATORY FAILURE, UNSPECIFIED WITH HYPERCAPNIA   Status: Acute   





(5) COPD exacerbation


Code(s): J44.1 - CHRONIC OBSTRUCTIVE PULMONARY DISEASE W (ACUTE) EXACERBATION   

Status: Acute   





- Plan





spoke with pt's  who did not know much except that she was sob and he 

brought her to the hospital. covid negative.  BNP not too elevated. she is a 

smoker possible copd exab.  did say that he thinks her oxygen is not 

working well at home. He also states that she stays at home.   she has a hx of 

mrsa bactremia and per records has a hx of drug use. she has a flail chordae 

but her MR is mild. I spoke with cardiology about her echo. if her blood cx 

becomes positive or she has a fever she will need a SUZANNE. 








7/18 pt doing well, got extubated 7/17 she will need bipap at night. she is on 

oxygen at night at home. she continues to smoke half a pack a day and does 

marijuana. pt abx have been switched to oral and so has her steroids. possible 

discharge in the next 24-48hr.

## 2020-07-18 NOTE — RAD
SINGLE VIEW CHEST:

 

Date:  07/18/2020

 

COMPARISON:  

07/17/2020. 

 

HISTORY:  

Ventilated patient with respiratory failure. 

 

FINDINGS:

Single view of the chest shows an enlarged but stable cardiomediastinal silhouette. The patient has b
een extubated. The NG tube has been removed. Linear opacity in the left lung base may represent atele
ctasis. 

 

IMPRESSION: 

Stable exam status post extubation. 

 

 

POS: EAA

## 2020-07-19 VITALS — SYSTOLIC BLOOD PRESSURE: 130 MMHG | DIASTOLIC BLOOD PRESSURE: 80 MMHG | TEMPERATURE: 98.1 F

## 2020-07-19 LAB
ANION GAP SERPL CALC-SCNC: 9 MMOL/L (ref 10–20)
BASOPHILS # BLD AUTO: 0 THOU/UL (ref 0–0.2)
BASOPHILS NFR BLD AUTO: 0 % (ref 0–1)
BUN SERPL-MCNC: 14 MG/DL (ref 9.8–20.1)
CALCIUM SERPL-MCNC: 8.9 MG/DL (ref 7.8–10.44)
CHLORIDE SERPL-SCNC: 104 MMOL/L (ref 98–107)
CO2 SERPL-SCNC: 31 MMOL/L (ref 22–29)
CREAT CL PREDICTED SERPL C-G-VRATE: 157 ML/MIN (ref 70–130)
EOSINOPHIL # BLD AUTO: 0.1 THOU/UL (ref 0–0.7)
EOSINOPHIL NFR BLD AUTO: 0.9 % (ref 0–10)
GLUCOSE SERPL-MCNC: 153 MG/DL (ref 70–105)
HGB BLD-MCNC: 15 G/DL (ref 12–16)
LYMPHOCYTES # BLD: 0.5 THOU/UL (ref 1.2–3.4)
LYMPHOCYTES NFR BLD AUTO: 8.5 % (ref 21–51)
MCH RBC QN AUTO: 29.2 PG (ref 27–31)
MCV RBC AUTO: 90.9 FL (ref 78–98)
MONOCYTES # BLD AUTO: 0.5 THOU/UL (ref 0.11–0.59)
MONOCYTES NFR BLD AUTO: 8.7 % (ref 0–10)
NEUTROPHILS # BLD AUTO: 5 THOU/UL (ref 1.4–6.5)
NEUTROPHILS NFR BLD AUTO: 81.8 % (ref 42–75)
PLATELET # BLD AUTO: 175 THOU/UL (ref 130–400)
POTASSIUM SERPL-SCNC: 3.2 MMOL/L (ref 3.5–5.1)
RBC # BLD AUTO: 5.15 MILL/UL (ref 4.2–5.4)
SODIUM SERPL-SCNC: 141 MMOL/L (ref 136–145)
WBC # BLD AUTO: 6.1 THOU/UL (ref 4.8–10.8)

## 2020-07-19 RX ADMIN — INSULIN GLARGINE SCH MLS: 100 INJECTION, SOLUTION SUBCUTANEOUS at 09:10

## 2020-07-19 RX ADMIN — MULTIPLE VITAMINS W/ MINERALS TAB SCH TAB: TAB at 09:09

## 2020-07-19 RX ADMIN — INSULIN LISPRO PRN UNIT: 100 INJECTION, SOLUTION INTRAVENOUS; SUBCUTANEOUS at 13:02

## 2020-07-19 RX ADMIN — FAMOTIDINE SCH MG: 10 INJECTION, SOLUTION INTRAVENOUS at 09:09

## 2020-07-19 RX ADMIN — FAMOTIDINE SCH MG: 10 INJECTION, SOLUTION INTRAVENOUS at 19:40

## 2020-07-19 NOTE — PRG
DATE OF SERVICE:  07/19/2020



SUBJECTIVE:  A 54-year-old female, who is intubated for respiratory failure and

pneumonia.  She is doing much better. 



OBJECTIVE:  VITAL SIGNS:  Temperature 98, pulse 89, blood pressure 137/87, sats 97

on 2 L. 

CHEST:  No wheezing.  No crackles. 

CARDIAC:  Normal S1, S2.  __________



LABORATORY DATA:  Labs are unremarkable.



IMPRESSION:  Respiratory failure, pneumonia, severe deconditioning, sleep apnea,

metabolic acidosis. 



She can be discharged home on present medication.  She is to see a primary care

physician at [facility].  Taper steroids over the course __________. 







Job ID:  [id number]

## 2020-07-19 NOTE — DIS
DATE OF ADMISSION:  07/15/2020



DATE OF DISCHARGE:  07/19/2020



DISCHARGE DIAGNOSES:  

1. Acute hypoxic respiratory failure requiring intubation, likely secondary to

pneumonia versus severe sleep apnea/obesity hypoventilation syndrome. 

2. Hypokalemia.

3. Flail Chordae Tendinae



CONSULTATIONS:  Pulmonary, Dr. Zackary Goodrich.



PROCEDURE:  Intubation, status post extubation.



BRIEF HISTORY OF PRESENT ILLNESS:  This is a 54-year-old female with a past 
medical

history of COPD, diabetes, CAD, CHF, who presented to the emergency room with

respiratory failure.  The patient was intubated on arrival.  Reportedly, the 
patient

had an oxygen saturation of 73% on room air.  Her ABG showed a pH of 7.5 and a 
pCO2

of 50 with a pO2 of 70.  The patient reportedly was using 3 L of oxygen at home.

Her chest x-ray showed a left lower lobe pneumonia.  She was started on

broad-spectrum antibiotics and sent for COVID swab and was admitted for further

workup. 



HOSPITAL COURSE:  

Acute hypoxic respiratory failure secondary to left lower lobe

pneumonia vs JOSE vs obesity hypoventilation syndrome  The patient was started 
on IV ceftriaxone, azithromycin, and

dexamethasone.  She received these from 07/15 to 07/17.  She was extubated on 07
/17

and was placed on BiPAP at night.  The patient was  switched to doxycycline on 7
/18 and her oxygen remained stable at 3

to 4 L which is her baseline.   Her COVID test was negative.  She was 
discharged on cefdinir and doxycycline for an additional 7

days and given a 10-day course of prednisone per pulmonary recommendations. 



  The patient does report a history of sleep apnea, however, she does not have 
a CPAP machine at home and only uses the oxygen while she is

sleeping. She was advised to obtain a prescription from her pulmonologist for a 
CPAP ASAP. 



Flail Chordae Tendinae:  ECHO showed an EF of 60-65% and a flail chordae 
tendinae.  I discussed this with cardiology who stated no intervention needed 
except for BP control and diuretics. SHe was discharged with lasix 40 mg bid. 



Tobacco abuse: She was discharged with a nicotine patch. 







DISCHARGE PHYSICAL EXAMINATION: 

 VITAL SIGNS:  Temperature 98.1, heart rate 90,

respiratory rate 20, O2 saturation 87% to 91% on room air and on ambulation, 
which

improved to 93% with 3 L of oxygen.  Blood pressure 130/80. 

GENERAL:  The patient is obese.  She is oriented x3. 

CVS:  Regular rate and rhythm with no murmurs, rubs, or gallops. 

LUNGS:  Clear to auscultation bilaterally. 

ABDOMEN:  Positive bowel sounds, soft, nontender, and nondistended. 

EXTREMITIES:  No edema.



PERTINENT LABORATORY DATA: 

 CBC on 07/19:  Unremarkable. 

BMP on 07/17:  Unremarkable. 

LFTs on 07/17:  Within normal limits. 

TSH on 07/17: 1.222. 

Procalcitonin: 0.07. 

COVID PCR on 07/15:  Not detected. 

ABG on 07/17:  PH 7.34, pCO2 of 58, and pO2 of 88.



IMAGING:  

Chest x-ray on 07/16:  Dense left lower lobe consolidation. 

Chest x-ray on 07/18:  Stable exam. 

Echo on 07/16:  Flailing mass on the atrial side of the mitral valve 
representing a

flail chordae tendineae.  Mild to moderate MR.  Mild TR.  Right ventricular 
systolic

pressure estimated at 35 mmHg.  Small pericardial effusion.  Mild pulmonic 
regurg.

EF 60% to 65% with grade 1/3 diastolic dysfunction. 



DISCHARGE CONDITION:  Stable.

ACTIVITY:  As tolerated.

DIET:  Heart healthy diet.



DISCHARGE MEDICATIONS:  

1. Cefdinir 300 mg p.o. q.12 hours for 14 capsules.

2. Doxycycline 100 mg p.o. b.i.d., 14 capsules.

3. Nicotine patch.

4. Prednisone 20 mg p.o. daily, quantity 10.

5. Trazodone 50 mg p.o. at bedtime p.r.n. for insomnia.



DISCHARGE INSTRUCTIONS:  The patient should follow up with her PCP and her

pulmonologist within a week.  She should get her CPAP prescription as soon as

possible.  She should get repeat chest x-ray in 6 weeks. 







Job ID:  [id number]



[facility]

## 2020-12-21 ENCOUNTER — HOSPITAL ENCOUNTER (INPATIENT)
Dept: HOSPITAL 92 - ERS | Age: 55
LOS: 6 days | Discharge: LEFT BEFORE DISCHARGE | DRG: 291 | End: 2020-12-27
Attending: INTERNAL MEDICINE | Admitting: INTERNAL MEDICINE
Payer: SELF-PAY

## 2020-12-21 VITALS — BODY MASS INDEX: 43.7 KG/M2

## 2020-12-21 DIAGNOSIS — F41.9: ICD-10-CM

## 2020-12-21 DIAGNOSIS — Z20.828: ICD-10-CM

## 2020-12-21 DIAGNOSIS — I50.33: ICD-10-CM

## 2020-12-21 DIAGNOSIS — J44.1: ICD-10-CM

## 2020-12-21 DIAGNOSIS — J96.21: ICD-10-CM

## 2020-12-21 DIAGNOSIS — Z53.29: ICD-10-CM

## 2020-12-21 DIAGNOSIS — J96.22: ICD-10-CM

## 2020-12-21 DIAGNOSIS — F32.9: ICD-10-CM

## 2020-12-21 DIAGNOSIS — F43.10: ICD-10-CM

## 2020-12-21 DIAGNOSIS — Z88.6: ICD-10-CM

## 2020-12-21 DIAGNOSIS — T38.0X5A: ICD-10-CM

## 2020-12-21 DIAGNOSIS — E83.42: ICD-10-CM

## 2020-12-21 DIAGNOSIS — E87.3: ICD-10-CM

## 2020-12-21 DIAGNOSIS — I11.0: Primary | ICD-10-CM

## 2020-12-21 DIAGNOSIS — M19.90: ICD-10-CM

## 2020-12-21 DIAGNOSIS — E66.01: ICD-10-CM

## 2020-12-21 DIAGNOSIS — G47.33: ICD-10-CM

## 2020-12-21 DIAGNOSIS — Z79.899: ICD-10-CM
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
## 2020-12-21 DIAGNOSIS — Z90.49: ICD-10-CM

## 2020-12-21 DIAGNOSIS — Z90.710: ICD-10-CM

## 2020-12-21 DIAGNOSIS — Z86.711: ICD-10-CM

## 2020-12-21 DIAGNOSIS — I47.1: ICD-10-CM

## 2020-12-21 DIAGNOSIS — Z87.891: ICD-10-CM

## 2020-12-21 DIAGNOSIS — E09.65: ICD-10-CM

## 2020-12-21 DIAGNOSIS — Z98.890: ICD-10-CM

## 2020-12-21 LAB
ALBUMIN SERPL BCG-MCNC: 3.7 G/DL (ref 3.5–5)
ALP SERPL-CCNC: 69 U/L (ref 40–110)
ALT SERPL W P-5'-P-CCNC: 33 U/L (ref 8–55)
ANALYZER IN CARDIO: (no result)
ANION GAP SERPL CALC-SCNC: 15 MMOL/L (ref 10–20)
AST SERPL-CCNC: 14 U/L (ref 5–34)
BASE EXCESS STD BLDA CALC-SCNC: 5.8 MEQ/L
BILIRUB SERPL-MCNC: 1 MG/DL (ref 0.2–1.2)
BUN SERPL-MCNC: 7 MG/DL (ref 9.8–20.1)
CA-I BLDA-SCNC: 1.15 MMOL/L (ref 1.12–1.3)
CALCIUM SERPL-MCNC: 8.6 MG/DL (ref 7.8–10.44)
CHLORIDE SERPL-SCNC: 97 MMOL/L (ref 98–107)
CO2 SERPL-SCNC: 37 MMOL/L (ref 22–29)
CREAT CL PREDICTED SERPL C-G-VRATE: 0 ML/MIN (ref 70–130)
GLOBULIN SER CALC-MCNC: 2.5 G/DL (ref 2.4–3.5)
GLUCOSE SERPL-MCNC: 256 MG/DL (ref 70–105)
HCO3 BLDA-SCNC: 34.5 MEQ/L (ref 22–28)
HCT VFR BLDA CALC: 50 % (ref 36–47)
HGB BLD-MCNC: 16.6 G/DL (ref 12–16)
HGB BLDA-MCNC: 17.1 G/DL (ref 12–16)
MCH RBC QN AUTO: 26.7 PG (ref 27–31)
MCV RBC AUTO: 87.3 FL (ref 78–98)
MDIFF COMPLETE?: YES
O2 A-A PPRESDIFF RESPIRATORY: 117.45 MMHG (ref 0–20)
PCO2 BLDA: 66.1 MMHG (ref 35–45)
PH BLDA: 7.34 [PH] (ref 7.35–7.45)
PLATELET # BLD AUTO: 224 THOU/UL (ref 130–400)
PO2 BLDA: 56.6 MMHG (ref 80–100)
POTASSIUM BLD-SCNC: 3.5 MMOL/L (ref 3.7–5.3)
POTASSIUM SERPL-SCNC: 3.8 MMOL/L (ref 3.5–5.1)
RBC # BLD AUTO: 6.2 MILL/UL (ref 4.2–5.4)
SODIUM SERPL-SCNC: 145 MMOL/L (ref 136–145)
SPECIMEN DRAWN FROM PATIENT: (no result)
WBC # BLD AUTO: 6.7 THOU/UL (ref 4.8–10.8)

## 2020-12-21 PROCEDURE — 83735 ASSAY OF MAGNESIUM: CPT

## 2020-12-21 PROCEDURE — 83615 LACTATE (LD) (LDH) ENZYME: CPT

## 2020-12-21 PROCEDURE — 96374 THER/PROPH/DIAG INJ IV PUSH: CPT

## 2020-12-21 PROCEDURE — 0240U: CPT

## 2020-12-21 PROCEDURE — 82805 BLOOD GASES W/O2 SATURATION: CPT

## 2020-12-21 PROCEDURE — 85379 FIBRIN DEGRADATION QUANT: CPT

## 2020-12-21 PROCEDURE — 85025 COMPLETE CBC W/AUTO DIFF WBC: CPT

## 2020-12-21 PROCEDURE — 86140 C-REACTIVE PROTEIN: CPT

## 2020-12-21 PROCEDURE — 80048 BASIC METABOLIC PNL TOTAL CA: CPT

## 2020-12-21 PROCEDURE — 36416 COLLJ CAPILLARY BLOOD SPEC: CPT

## 2020-12-21 PROCEDURE — 80053 COMPREHEN METABOLIC PANEL: CPT

## 2020-12-21 PROCEDURE — 94640 AIRWAY INHALATION TREATMENT: CPT

## 2020-12-21 PROCEDURE — 93798 PHYS/QHP OP CAR RHAB W/ECG: CPT

## 2020-12-21 PROCEDURE — 71045 X-RAY EXAM CHEST 1 VIEW: CPT

## 2020-12-21 PROCEDURE — 83880 ASSAY OF NATRIURETIC PEPTIDE: CPT

## 2020-12-21 PROCEDURE — 36415 COLL VENOUS BLD VENIPUNCTURE: CPT

## 2020-12-21 PROCEDURE — 93005 ELECTROCARDIOGRAM TRACING: CPT

## 2020-12-21 PROCEDURE — 87040 BLOOD CULTURE FOR BACTERIA: CPT

## 2020-12-21 PROCEDURE — 83036 HEMOGLOBIN GLYCOSYLATED A1C: CPT

## 2020-12-21 PROCEDURE — 84443 ASSAY THYROID STIM HORMONE: CPT

## 2020-12-21 PROCEDURE — 84484 ASSAY OF TROPONIN QUANT: CPT

## 2020-12-21 PROCEDURE — 82728 ASSAY OF FERRITIN: CPT

## 2020-12-21 PROCEDURE — 36600 WITHDRAWAL OF ARTERIAL BLOOD: CPT

## 2020-12-21 PROCEDURE — 93306 TTE W/DOPPLER COMPLETE: CPT

## 2020-12-21 RX ADMIN — INSULIN LISPRO PRN UNIT: 100 INJECTION, SOLUTION INTRAVENOUS; SUBCUTANEOUS at 21:17

## 2020-12-21 NOTE — RAD
PORTABLE CHEST:

 

Date:  12/21/2020

 

HISTORY:  

Dyspnea and right-sided pain. 

 

COMPARISON:  

07/18/2020 exam. 

 

FINDINGS:

Heart size is enlarged. Pulmonary vessels are engorged. The parenchymal changes in the left base appe
ar fairly similar to the previous exam and may be chronic in nature. 

 

IMPRESSION: 

1.  Cardiomegaly with pulmonary vascular engorgement. 

2.  Parenchymal changes of the left base, fairly similar to the previous exam, may represent chronic 
change. 

 

 

POS: ALFREDO

## 2020-12-21 NOTE — PDOC.HHP
Hospitalist HPI





- History of Present Illness


Shortness of breath


History of Present Illness: 





Patient was transferred from [facility], patient been 

there for low oxygen saturation, per patient her oxygen saturation was running 

in 70s, she was having increasing shortness of breath, paramedics was called and

patient was found with a saturation 57% on room air, she has underlying COPD and

she is not on any home oxygen therapy, patient was kept on 15 L nasal cannula 

oxygen and after the saturation improved to 97%, patient had negative COVID-19 

screen, patient was given albuterol nebulization therapy, after return to our 

emergency room patient was given Proventil HFA, Solu-Medrol 125 mg, Tylenol 1 g,

patient had chest x-ray which showed chronic changes in left lower lobe, she did

not have any fever or chills, she denies any sinus symptoms, she denies any sore

throat, she denies any nausea vomiting diarrhea, she denies any exposure with 

COVID-19, she denies any loss of taste or smell sensation.  Patient is being 

admitted to telemetry floor for COPD exacerbation and acute respiratory failure 

with hypoxia.


ED Course: 





Patient is given Solu-Medrol 125 mg, albuterol inhaler, Tylenol





VITAL SIGNS Mon Dec 21, 2020 09:08 NATHAN Fitzgerald Elizabeth





 


BP: 159/110, MAP: 126, Pulse: 104, Resp: 24, Temp: 98.8 (Oral), Pain: 8, O2 sat:

71 on (Room Air), Time: 12/21/2020 09:08.





VITAL SIGNS Mon Dec 21, 2020 09:19 NATHAN Fitzgerald Elizabeth





 


Pulse: 99, Resp: 22, O2 sat: 85, Time: 12/21/2020 09:19.





VITAL SIGNS Mon Dec 21, 2020 09:42 NATAHN Fitzgerald Elizabeth





 


BP: 206/143, MAP: 164, Pulse: 98, Resp: 26, Temp: 98.8 (Oral), Pain: 8, O2 sat: 

88 on (4L Oxygen), Time: 12/21/2020 09:42.





VITAL SIGNS Mon Dec 21, 2020 10:20 NATHAN Manuel Cullen





 


BP: 147/117, Pulse: 91, Resp: 30, Temp: 98.9, Pain: 8, O2 sat: 90, Time: 

12/21/2020 10:20.





VITAL SIGNS Mon Dec 21, 2020 11:31 NATHAN Fitzgerald Elizabeth





 


BP: 157/101, MAP: 119, Pulse: 88, Resp: 28, Temp: 98.3 (Oral), Pain: 7, O2 sat: 

96 on (4L Oxygen), Time: 12/21/2020 11:31.





VITAL SIGNS Mon Dec 21, 2020 12:25 NATHAN Fitzgerald Elizabeth





 


BP: 155/125, MAP: 135, Pulse: 88, Resp: 28, Pain: 6, O2 sat: 98 on (4L Oxygen), 

Time: 12/21/2020 12:25





Hospitalist ROS





- Review of Systems


Constitutional: reports: weakness.  denies: fever, chills, sweats, malaise, 

other


ENT: denies: ear pain, ear discharge, nose pain, nose discharge, nose conges

tion, mouth pain, mouth swelling, throat pain, throat swelling, other


Respiratory: reports: shortness of breath, SOB with excertion.  denies: cough, 

dry, hemoptysis, pleuritic pain, sputum, wheezing, other


Cardiovascular: denies: chest pain, palpitations, orthopnea, paroxysmal noc. 

dyspnea, edema, light headedness, other


Gastrointestinal: denies: nausea, vomiting, abdominal pain, diarrhea, 

constipation, melena, hematochezia, other


Genitourinary: denies: dysuria, frequency, incontinence, hematuria, retention, 

other


Musculoskeletal: denies: neck pain, shoulder pain, arm pain, back pain, hand 

pain, leg pain, foot pain, other


Skin: denies: rash, lesions, mono, bruising, other





- Medication


Medications: 


Home medication


                                        











 Medication  Instructions  Recorded  Confirmed  Type


 


Docusate [Colace] 100 mg PO BID #0 cap 08/20/13  Rx


 


HYDROcodone Bit/APAP 10/325 [Norco] 1 tab PO Q4H PRN #0 tab 08/20/13  Rx


 


Lorazepam [Ativan] 1 mg PO BID #60 tab 08/20/13  Rx


 


Potassium Chloride [K-Dur] 20 meq PO DAILY #0 tab 08/20/13  Rx


 


Sennosides [Senna] 8.6 mg PO DAILY PRN #0 capsule 08/20/13  Rx


 


Amlodipine [Norvasc] 10 mg PO DAILY 07/15/20 07/15/20 History


 


Carvedilol 6.25 mg PO 07/15/20  History


 


Furosemide [Lasix] BID 07/15/20  History


 


Losartan [Cozaar] 25 mg PO DAILY 07/15/20 07/15/20 History


 


Cefdinir 300 mg PO Q12HR #14 capsule 07/19/20  Rx


 


Doxycycline [Vibramycin] 100 mg PO BID #14 cap 07/19/20  Rx


 


Nicotine [Nicotine Patch] 1 each TD DAILY #30 patch.td24 07/19/20  Rx


 


predniSONE 20 mg PO QAM-WM #10 tab 07/19/20  Rx


 


traZODone HCl [Desyrel] 50 mg PO HS PRN #3 tab 07/19/20  Rx








Allergy


Allergies





codeine Allergy (Unknown, Verified 08/11/13 05:30)


   











Hospitalist History





- Past Medical History


Other Medical History: 





Hypertension


COPD


Chronic diastolic heart failure


Obesity


History of pulmonary embolism


Anxiety and depression





- Past Surgical History


Past Surgical History: reports: Cholecystectomy, Hysterectomy


Other Surgical History: 





Hysterectomy


Back surgery


Fibroid removal


Cholecystectomy





- Family History


Other Family History: 





No strong family history of premature coronary artery disease stroke or cancer





- Social History


Alcohol: reports: Occassional


Drugs: reports: marijuana


Other Social History: 





Patient has history of marijuana abuse, she is former smoker, no alcohol abuse





- Exam


General Appearance: NAD, awake alert


Eye: PERRL, anicteric sclera


ENT: normocephalic atraumatic, no oropharyngeal lesions


Neck: supple, symmetric, no JVD, no thyromegaly


Heart: RRR, no murmur, no gallops, no rubs


Respiratory - other findings: Bilateral wheezing, air entry reduced


Gastrointestinal: soft, non-tender, non-distended, normal bowel sounds


Extremities: no cyanosis, no clubbing, no edema


Skin: normal turgor, no lesions


Neurological: no focal deficits


Musculoskeletal: normal tone, normal strength, no muscle wasting


Psychiatric: normal affect, normal behavior, A&O x 3





Hospitalist Results





- Labs


Result Diagrams: 


                                 12/21/20 09:51





                                 12/21/20 09:51


Lab results: 


                                        











WBC  6.7 thou/uL (4.8-10.8)   12/21/20  09:51    


 


Hgb  16.6 g/dL (12.0-16.0)  H  12/21/20  09:51    


 


Hct  54.1 % (36.0-47.0)  H  12/21/20  09:51    


 


MCV  87.3 fL (78.0-98.0)   12/21/20  09:51    


 


Plt Count  224 thou/uL (130-400)   12/21/20  09:51    


 


Band Neuts % (Manual)  11 % (5-11)   12/21/20  09:51    


 


ABG pH  7.34  (7.35-7.45)  L  12/21/20  09:40    


 


ABG pCO2  66.1 mmHg (35.0-45.0)  H*  12/21/20  09:40    


 


ABG pO2  56.6 mmHg (80.0-100.0)  L*  12/21/20  09:40    


 


Sodium  145 mmol/L (136-145)   12/21/20  09:51    


 


Potassium  3.8 mmol/L (3.5-5.1)   12/21/20  09:51    


 


Chloride  97 mmol/L ()  L  12/21/20  09:51    


 


Carbon Dioxide  37 mmol/L (22-29)  H  12/21/20  09:51    


 


BUN  7 mg/dL (9.8-20.1)  L  12/21/20  09:51    


 


Creatinine  0.80 mg/dL (0.6-1.1)   12/21/20  09:51    


 


Glucose  256 mg/dL ()  H  12/21/20  09:51    


 


Calcium  8.6 mg/dL (7.8-10.44)   12/21/20  09:51    


 


Total Bilirubin  1.0 mg/dL (0.2-1.2)   12/21/20  09:51    


 


AST  14 U/L (5-34)   12/21/20  09:51    


 


ALT  33 U/L (8-55)   12/21/20  09:51    


 


Alkaline Phosphatase  69 U/L ()   12/21/20  09:51    


 


Troponin I  0.024 ng/mL (< 0.028)   12/21/20  09:51    


 


B-Natriuretic Peptide  569.4 pg/mL (0-100)  H  12/21/20  09:51    


 


Serum Total Protein  6.2 g/dL (6.0-8.3)   12/21/20  09:51    


 


Albumin  3.7 g/dL (3.5-5.0)   12/21/20  09:51    














- Radiology Interpretation


  ** Chest x-ray


Status: image reviewed by me


Additional Comment: 





Chest x-ray reviewed, chronic changes in left lower lobe





Hospitalist H&P A/P





- Problem


(1) Acute respiratory failure with hypoxia and hypercapnia


Code(s): J96.01 - ACUTE RESPIRATORY FAILURE WITH HYPOXIA; J96.02 - ACUTE 

RESPIRATORY FAILURE WITH HYPERCAPNIA   Status: Acute   





(2) Acute on chronic diastolic heart failure


Code(s): I50.33 - ACUTE ON CHRONIC DIASTOLIC (CONGESTIVE) HEART FAILURE   

Status: Acute   





(3) COPD exacerbation


Code(s): J44.1 - CHRONIC OBSTRUCTIVE PULMONARY DISEASE W (ACUTE) EXACERBATION   

Status: Acute   





(4) HTN (hypertension)


Code(s): I10 - ESSENTIAL (PRIMARY) HYPERTENSION   Status: Chronic   


Qualifiers: 


   Hypertension type: essential hypertension   Qualified Code(s): I10 - Essent

ial (primary) hypertension   





(5) Morbid obesity


Code(s): E66.01 - MORBID (SEVERE) OBESITY DUE TO EXCESS CALORIES   Status: Acute

   





(6) Diabetes type 2, controlled


Code(s): E11.9 - TYPE 2 DIABETES MELLITUS WITHOUT COMPLICATIONS   Status: 

Chronic   


Qualifiers: 


   Diabetes mellitus long term insulin use: with long term use 





- Plan


Plan: 





Plan





Admission to telemetry floor


DuoNeb q. 4 hourly


Pulmicort nebulization twice daily


Solu-Medrol 40 mg IV every 8 hourly


Lasix 40 mg IV twice daily


Acetazolamide to 50 mg twice daily


Aggressive sliding scale insulin


Monitor oxygen saturation


Home medication will be reconciled


Get D-dimer, LDH, CRP, ferritin,





DVT prophylaxis Lovenox 40 mg subcu daily





GI prophylaxis Pepcid 20 mg p.o. twice daily





CODE STATUS patient is full code Composite Graft Text: The defect edges were debeveled with a #15 scalpel blade.  Given the location of the defect, shape of the defect, the proximity to free margins and the fact the defect was full thickness a composite graft was deemed most appropriate.  The defect was outline and then transferred to the donor site.  A full thickness graft was then excised from the donor site. The graft was then placed in the primary defect, oriented appropriately and then sutured into place.  The secondary defect was then repaired using a primary closure.

## 2020-12-22 LAB
ALBUMIN SERPL BCG-MCNC: 3.2 G/DL (ref 3.5–5)
ALP SERPL-CCNC: 65 U/L (ref 40–110)
ALT SERPL W P-5'-P-CCNC: 28 U/L (ref 8–55)
ANION GAP SERPL CALC-SCNC: 13 MMOL/L (ref 10–20)
AST SERPL-CCNC: 20 U/L (ref 5–34)
BASOPHILS # BLD AUTO: 0 THOU/UL (ref 0–0.2)
BASOPHILS NFR BLD AUTO: 0 % (ref 0–1)
BILIRUB SERPL-MCNC: 0.5 MG/DL (ref 0.2–1.2)
BUN SERPL-MCNC: 9 MG/DL (ref 9.8–20.1)
CALCIUM SERPL-MCNC: 8.7 MG/DL (ref 7.8–10.44)
CHLORIDE SERPL-SCNC: 99 MMOL/L (ref 98–107)
CO2 SERPL-SCNC: 32 MMOL/L (ref 22–29)
CREAT CL PREDICTED SERPL C-G-VRATE: 153 ML/MIN (ref 70–130)
EOSINOPHIL # BLD AUTO: 0 THOU/UL (ref 0–0.7)
EOSINOPHIL NFR BLD AUTO: 0.4 % (ref 0–10)
GLOBULIN SER CALC-MCNC: 2.8 G/DL (ref 2.4–3.5)
GLUCOSE SERPL-MCNC: 347 MG/DL (ref 70–105)
HGB BLD-MCNC: 17 G/DL (ref 12–16)
LYMPHOCYTES # BLD: 0.3 THOU/UL (ref 1.2–3.4)
LYMPHOCYTES NFR BLD AUTO: 5.7 % (ref 21–51)
MCH RBC QN AUTO: 29.2 PG (ref 27–31)
MCV RBC AUTO: 88.5 FL (ref 78–98)
MONOCYTES # BLD AUTO: 0.2 THOU/UL (ref 0.11–0.59)
MONOCYTES NFR BLD AUTO: 3.7 % (ref 0–10)
NEUTROPHILS # BLD AUTO: 5.1 THOU/UL (ref 1.4–6.5)
NEUTROPHILS NFR BLD AUTO: 90.2 % (ref 42–75)
PLATELET # BLD AUTO: 193 THOU/UL (ref 130–400)
POTASSIUM SERPL-SCNC: 4.7 MMOL/L (ref 3.5–5.1)
RBC # BLD AUTO: 5.8 MILL/UL (ref 4.2–5.4)
SODIUM SERPL-SCNC: 139 MMOL/L (ref 136–145)
WBC # BLD AUTO: 5.6 THOU/UL (ref 4.8–10.8)

## 2020-12-22 RX ADMIN — INSULIN LISPRO PRN UNIT: 100 INJECTION, SOLUTION INTRAVENOUS; SUBCUTANEOUS at 10:31

## 2020-12-22 RX ADMIN — INSULIN GLARGINE SCH MLS: 100 INJECTION, SOLUTION SUBCUTANEOUS at 20:08

## 2020-12-22 RX ADMIN — INSULIN LISPRO PRN UNIT: 100 INJECTION, SOLUTION INTRAVENOUS; SUBCUTANEOUS at 16:32

## 2020-12-22 RX ADMIN — INSULIN LISPRO PRN UNIT: 100 INJECTION, SOLUTION INTRAVENOUS; SUBCUTANEOUS at 05:33

## 2020-12-23 LAB
ANION GAP SERPL CALC-SCNC: 17 MMOL/L (ref 10–20)
BUN SERPL-MCNC: 16 MG/DL (ref 9.8–20.1)
CALCIUM SERPL-MCNC: 8.9 MG/DL (ref 7.8–10.44)
CHLORIDE SERPL-SCNC: 96 MMOL/L (ref 98–107)
CO2 SERPL-SCNC: 33 MMOL/L (ref 22–29)
CREAT CL PREDICTED SERPL C-G-VRATE: 160 ML/MIN (ref 70–130)
GLUCOSE SERPL-MCNC: 202 MG/DL (ref 70–105)
MAGNESIUM SERPL-MCNC: 2 MG/DL (ref 1.6–2.6)
POTASSIUM SERPL-SCNC: 3.9 MMOL/L (ref 3.5–5.1)
SODIUM SERPL-SCNC: 142 MMOL/L (ref 136–145)

## 2020-12-23 RX ADMIN — INSULIN LISPRO PRN UNIT: 100 INJECTION, SOLUTION INTRAVENOUS; SUBCUTANEOUS at 18:05

## 2020-12-23 RX ADMIN — INSULIN GLARGINE SCH MLS: 100 INJECTION, SOLUTION SUBCUTANEOUS at 21:19

## 2020-12-23 RX ADMIN — INSULIN LISPRO PRN UNIT: 100 INJECTION, SOLUTION INTRAVENOUS; SUBCUTANEOUS at 06:06

## 2020-12-23 RX ADMIN — INSULIN GLARGINE SCH MLS: 100 INJECTION, SOLUTION SUBCUTANEOUS at 09:43

## 2020-12-23 RX ADMIN — INSULIN LISPRO PRN UNIT: 100 INJECTION, SOLUTION INTRAVENOUS; SUBCUTANEOUS at 21:53

## 2020-12-23 NOTE — PDOC.HOSPP
- Subjective


Encounter Date: 12/23/20


Encounter Time: 16:30


Subjective: 


Patient seen and examined for respiratory failure.  Shortness of breath 

improving.  No chest pain or palpitations.  No fever or chills.





- Objective


Vital Signs & Weight: 


                             Vital Signs (12 hours)











  Temp Pulse Pulse Pulse Resp BP BP


 


 12/23/20 19:55  98.7 F  68    14  


 


 12/23/20 19:25   87    18  


 


 12/23/20 15:20  98.5 F  81    16  


 


 12/23/20 12:50    84  80   171/85 H  144/81 H














  BP BP Pulse Ox


 


 12/23/20 19:55  128/58 L   94 L


 


 12/23/20 19:25    90 L


 


 12/23/20 15:20   142/69 H  94 L


 


 12/23/20 12:50   








                                     Weight











Weight                         254 lb 11.2 oz














I&O: 


                                        











 12/22/20 12/23/20 12/24/20





 06:59 06:59 06:59


 


Intake Total 524 2144 


 


Output Total  3000 


 


Balance 524 -856 











Result Diagrams: 


                                 12/22/20 03:59





                                 12/23/20 10:25


Additional Labs: 


                                   Accuchecks











  12/23/20 12/23/20 12/23/20





  20:23 17:52 05:51


 


POC Glucose  335 H  326 H  271 H








                                        


Abnormal Lab Results - Last 48 hrs





12/22/20 03:59: Carbon Dioxide 32 H, BUN 9 L, Albumin 3.2 L, Albumin/Globulin 

Ratio 1.1 L


12/22/20 03:59: RBC 5.80 H, Hgb 17.0 H, Hct 51.4 H, RDW 16.6 H, Neutrophils % 

90.2 H, Lymphocytes % 5.7 L, Lymphocytes # 0.3 L


12/22/20 03:59: Hemoglobin A1c 7.7 H


12/23/20 10:25: Chloride 96 L, Carbon Dioxide 33 H








EKG Reviewed by me: Yes (Sinus rhythm on telemetry)





Hospitalist ROS





- Review of Systems


Cardiovascular: reports: orthopnea, paroxysmal noc. dyspnea, edema.  denies: 

chest pain, palpitations, light headedness, other


Gastrointestinal: denies: nausea, vomiting, abdominal pain, diarrhea, 

constipation, melena, hematochezia, other


Genitourinary: denies: dysuria, frequency, incontinence, hematuria, retention, 

other





- Medication


Medications: 


Active Medications











Generic Name Dose Route Start Last Admin





  Trade Name Freq  PRN Reason Stop Dose Admin


 


Acetaminophen  650 mg  12/21/20 15:09  12/23/20 21:18





  Acetaminophen 325 Mg Tab  PO   650 mg





  Q4H PRN   Administration





  Headache/Fever/Mild Pain (1-3)  


 


Albuterol/Ipratropium  3 ml  12/21/20 19:00  12/23/20 19:25





  Ipratropium/Albuterol Sulfate 3 Ml Neb  NEB   3 ml





  [id number] JAYLA   Administration


 


Budesonide  0.5 mg  12/21/20 18:30  12/23/20 19:26





  Budesonide 0.5 Mg/2 Ml Neb  NEB   0.5 mg





  BID-RT JAYLA   Administration


 


Doxycycline Hyclate  100 mg  12/23/20 21:00  12/23/20 21:18





  Doxycycline 100 Mg Cap  PO   100 mg





  BID JAYLA   Administration


 


Enoxaparin Sodium  40 mg  12/22/20 09:00  12/23/20 09:42





  Enoxaparin Sodium 40 Mg/0.4 Ml Syringe  SC   40 mg





  0900 JAYLA   Administration


 


Famotidine  20 mg  12/21/20 21:00  12/23/20 21:18





  Famotidine 20 Mg Tab  PO   20 mg





  BID JAYLA   Administration


 


Furosemide  40 mg  12/22/20 06:00  12/23/20 14:22





  Furosemide 40 Mg/4 Ml Vial  SLOW IVP   40 mg





  0600,1400 JAYLA   Administration


 


Insulin Glargine 15 units/  0.15 mls @ 0 mls/hr  12/22/20 21:00  12/23/20 21:19





  Miscellaneous Medication  SC   0.15 mls





  BID JAYLA   Administration


 


Insulin Human Lispro  0 units  12/21/20 15:11  12/23/20 18:05





  Humalog 300 Units/3 Ml Vial  SC   11 unit





  .AGGRESSIVE SLIDING  PRN   Administration





  Aggressive Correctional Scale  


 


Insulin Human Lispro  0 units  12/21/20 15:11  12/23/20 21:53





  Humalog 300 Units/3 Ml Vial  SC   4 unit





  .BEDTIME SLIDING SC PRN   Administration





  Bedtime Correctional Scale  














- Exam


General Appearance: ill appearing


Neck: supple


Heart: RRR, no gallops


Respiratory: rales, rhonchi, tachypneic, wheezes


Gastrointestinal: soft, non-tender, normal bowel sounds


Extremities: 1+ LE edema





Hosp A/P





- Plan


DVT proph w/SCDs





Patient is a 55-year-old female with morbid obesity, COPD with chronic 

respiratory failure on home oxygen 3 L nasal cannula especially at night and 

obstructive sleep apnea not on CPAP presented to the emergency room at [facility] in [place] with worsening shortness of breath and hypoxia with O2

sats of 57% on room air. Her O2 concentrator has stopped working. Her work-up 

was consistent with COPD/CHF exacerbation.  She was transferred to this facility

for hospital admission.  Please refer to the history and physical for further 

details.





The patient was admitted to the telemetry unit with above diagnosis.  She was 

started on steroids along with IV Lasix, O2 supplementation with nebulizer 

treatment.





Assessment: 


Acute on chronic hypoxic and hypercapnic respiratory failurePOA


Acute on chronic diastolic heart failure


COPD exacerbation


Obstructive sleep apnea  not on CPAP


Hypertension


Morbid obesity with a BMI of 44.4


Diabetes mellitus type 2 with hyperglycemia due to steroids


Codeine allergy


Anxiety


History of pulmonary embolism


Degenerative joint disease





Plan: 


Continue O2 supplementation.  Continue IV Lasix.  Change IV steroids to po.  

Continue current dose of Lantus with sliding scale.  Discontinue acetazolamide. 

Consult  for home O2 sat of.  Her O2 concentrator has stopped 

working.  A.m. labs.  Continue fluid restriction.  Continue cardiac rehab.  

Continue other medications as above

## 2020-12-23 NOTE — PDOC.HOSPP
- Subjective


Encounter Date: 12/22/20


Encounter Time: 15:00


Subjective: 


Patient seen and examined for respiratory failure.  Shortness of breath 

improving.  Denies any fever or chills.  Currently on 3 L home oxygen.  Her CPAP

machine is broken.  Mild dry cough reported.





- Objective


Vital Signs & Weight: 


                             Vital Signs (12 hours)











  Temp Pulse Resp BP Pulse Ox


 


 12/23/20 07:06   96  14  


 


 12/23/20 03:34  98.5 F  87  18  153/84 H  92 L


 


 12/23/20 00:00   79   








                                     Weight











Weight                         258 lb 8 oz














I&O: 


                                        











 12/22/20 12/23/20 12/24/20





 06:59 06:59 06:59


 


Intake Total 524 2144 


 


Output Total  3000 


 


Balance 524 856 











Result Diagrams: 


                                 12/22/20 03:59





                                 12/23/20 10:25


Additional Labs: 


                                   Accuchecks











  12/23/20 12/22/20 12/22/20





  05:51 19:50 16:19


 


POC Glucose  271 H  235 H  268 H














  12/22/20





  10:19


 


POC Glucose  360 H








                                        





Abnormal Lab Results - Last 48 hrs





12/21/20 09:40: Bicarbonate Actual 34.5 H, ABG pH 7.34 L, ABG pCO2 66.1 H*, ABG 

pO2 56.6 L*, ABG O2 Sat (Measured) 86.3 L*, ABG Base Excess 5.8 H, ABG 

Hematocrit 50.0 H, ABG Hemoglobin 17.1 H, ABG Oxyhemoglobin 82.9 L, ABG 

Carboxyhemoglobin 3.6 H, ABG Deoxyhemoglobin 13.2 H, A-a O2 Gradient 117.455 H, 

Potassium 3.50 L


12/21/20 09:51: RBC 6.20 H, Hgb 16.6 H, Hct 54.1 H, MCH 26.7 L, MCHC 30.6 L, RDW

16.6 H


12/21/20 09:51: Chloride 97 L, Carbon Dioxide 37 H, BUN 7 L


12/21/20 09:51: B-Natriuretic Peptide 569.4 H


12/21/20 15:44: Lactate Dehydrogenase 257 H


12/21/20 15:44: C-Reactive Protein 1.81 H











Radiology Reviewed by me: Yes (Chest x-ray showed pulmonary vascular congestion)


EKG Reviewed by me: Yes (Sinus rhythm on telemetry)





Hospitalist ROS





- Review of Systems


Cardiovascular: denies: chest pain, palpitations, orthopnea, paroxysmal noc. 

dyspnea, edema, light headedness, other


Gastrointestinal: denies: nausea, vomiting, abdominal pain, diarrhea, 

constipation, melena, hematochezia, other





- Medication


Medications: 


Active Medications











Generic Name Dose Route Start Last Admin





  Trade Name Freq  PRN Reason Stop Dose Admin


 


Acetaminophen  650 mg  12/21/20 15:09  12/23/20 00:40





  Acetaminophen 325 Mg Tab  PO   650 mg





  Q4H PRN   Administration





  Headache/Fever/Mild Pain (1-3)  


 


Acetazolamide  250 mg  12/21/20 21:00  12/22/20 20:08





  Acetazolamide 250 Mg Tab  PO   250 mg





  BID JAYLA   Administration


 


Albuterol/Ipratropium  3 ml  12/21/20 19:00  12/23/20 07:06





  Ipratropium/Albuterol Sulfate 3 Ml Neb  NEB   3 ml





  [id number] JAYLA   Administration


 


Budesonide  0.5 mg  12/21/20 18:30  12/23/20 07:05





  Budesonide 0.5 Mg/2 Ml Neb  NEB   0.5 mg





  BID-RT JAYLA   Administration


 


Enoxaparin Sodium  40 mg  12/22/20 09:00  12/22/20 08:06





  Enoxaparin Sodium 40 Mg/0.4 Ml Syringe  SC   40 mg





  0900 JAYLA   Administration


 


Famotidine  20 mg  12/21/20 21:00  12/22/20 20:08





  Famotidine 20 Mg Tab  PO   20 mg





  BID JAYLA   Administration


 


Furosemide  40 mg  12/22/20 06:00  12/23/20 05:47





  Furosemide 40 Mg/4 Ml Vial  SLOW IVP   40 mg





  0600,1400 JAYLA   Administration


 


Insulin Glargine 15 units/  0.15 mls @ 0 mls/hr  12/22/20 21:00  12/22/20 20:08





  Miscellaneous Medication  SC   0.15 mls





  BID JAYLA   Administration


 


Insulin Human Lispro  0 units  12/21/20 15:11  12/23/20 06:06





  Humalog 300 Units/3 Ml Vial  SC   9 unit





  .AGGRESSIVE SLIDING  PRN   Administration





  Aggressive Correctional Scale  


 


Insulin Human Lispro  0 units  12/21/20 15:11  12/21/20 21:17





  Humalog 300 Units/3 Ml Vial  SC   4 unit





  .BEDTIME SLIDING SC PRN   Administration





  Bedtime Correctional Scale  


 


Methylprednisolone Sodium Succinate  40 mg  12/21/20 16:00  12/23/20 00:38





  Methylprednisolone Sod Succ/Pf 125 Mg/2 Ml Vial  IVP   40 mg





  0800,1600,2359 JAYLA   Administration














- Exam


General Appearance: ill appearing


General - other findings: In respiratory distress


Heart: RRR, no gallops, no rubs, normal peripheral pulses


Respiratory: no wheezes, normal chest expansion, rales, rhonchi, tachypneic


Gastrointestinal: soft, normal bowel sounds, no guarding, no rigidity


Extremities: no cyanosis, no clubbing


Neurological: no new deficit


Psychiatric: normal affect, A&O x 3





Hosp A/P





- Plan


DVT proph w/SCDs





Patient is a 55-year-old female with morbid obesity, COPD with chronic 

respiratory failure on home oxygen 3 L nasal cannula especially at night and 

obstructive sleep apnea not on CPAP presented to the emergency room at [facility] in [place] with worsening shortness of breath and hypoxia with O2

sats of 57% on room air. Her O2 concentrator has stopped working. Her work-up 

was consistent with COPD/CHF exacerbation.  She was transferred to this facility

for hospital admission.  Please refer to the history and physical for further 

details.





The patient was admitted to the telemetry unit with above diagnosis.  She was 

started on steroids along with IV Lasix, O2 supplementation with nebulizer 

treatment.





Assessment: 


Acute on chronic hypoxic and hypercapnic respiratory failurePOA


Acute on chronic diastolic heart failure


COPD exacerbation


Obstructive sleep apnea  not on CPAP


Hypertension


Morbid obesity with a BMI of 44.4


Diabetes mellitus type 2 with hyperglycemia due to steroids


Codeine allergy


Anxiety


History of pulmonary embolism


Degenerative joint disease





Plan: 


Continue O2 supplementation with IV steroids and Lasix.  Continue nebulizer 

treatment.  Continue Symbicort.  Continue fluid restriction patient was advised 

to get a sleep study as outpatient monitor labs on the daily basis.  Add 

doxycycline

## 2020-12-24 LAB
ALBUMIN SERPL BCG-MCNC: 3.2 G/DL (ref 3.5–5)
ALP SERPL-CCNC: 55 U/L (ref 40–110)
ALT SERPL W P-5'-P-CCNC: 21 U/L (ref 8–55)
ANION GAP SERPL CALC-SCNC: 11 MMOL/L (ref 10–20)
AST SERPL-CCNC: 9 U/L (ref 5–34)
BASOPHILS # BLD AUTO: 0 THOU/UL (ref 0–0.2)
BASOPHILS NFR BLD AUTO: 0 % (ref 0–1)
BILIRUB SERPL-MCNC: 0.5 MG/DL (ref 0.2–1.2)
BUN SERPL-MCNC: 17 MG/DL (ref 9.8–20.1)
CALCIUM SERPL-MCNC: 8.8 MG/DL (ref 7.8–10.44)
CHLORIDE SERPL-SCNC: 97 MMOL/L (ref 98–107)
CO2 SERPL-SCNC: 37 MMOL/L (ref 22–29)
CREAT CL PREDICTED SERPL C-G-VRATE: 153 ML/MIN (ref 70–130)
EOSINOPHIL # BLD AUTO: 0 THOU/UL (ref 0–0.7)
EOSINOPHIL NFR BLD AUTO: 0.3 % (ref 0–10)
GLOBULIN SER CALC-MCNC: 2.3 G/DL (ref 2.4–3.5)
GLUCOSE SERPL-MCNC: 262 MG/DL (ref 70–105)
HGB BLD-MCNC: 15.7 G/DL (ref 12–16)
LYMPHOCYTES # BLD: 0.4 THOU/UL (ref 1.2–3.4)
LYMPHOCYTES NFR BLD AUTO: 6.1 % (ref 21–51)
MAGNESIUM SERPL-MCNC: 2 MG/DL (ref 1.6–2.6)
MCH RBC QN AUTO: 29.1 PG (ref 27–31)
MCV RBC AUTO: 87.5 FL (ref 78–98)
MONOCYTES # BLD AUTO: 0.5 THOU/UL (ref 0.11–0.59)
MONOCYTES NFR BLD AUTO: 8.5 % (ref 0–10)
NEUTROPHILS # BLD AUTO: 5.1 THOU/UL (ref 1.4–6.5)
NEUTROPHILS NFR BLD AUTO: 85.1 % (ref 42–75)
PLATELET # BLD AUTO: 182 THOU/UL (ref 130–400)
POTASSIUM SERPL-SCNC: 4 MMOL/L (ref 3.5–5.1)
RBC # BLD AUTO: 5.38 MILL/UL (ref 4.2–5.4)
SODIUM SERPL-SCNC: 141 MMOL/L (ref 136–145)
WBC # BLD AUTO: 5.9 THOU/UL (ref 4.8–10.8)

## 2020-12-24 RX ADMIN — INSULIN LISPRO PRN UNIT: 100 INJECTION, SOLUTION INTRAVENOUS; SUBCUTANEOUS at 18:08

## 2020-12-24 RX ADMIN — INSULIN GLARGINE SCH MLS: 100 INJECTION, SOLUTION SUBCUTANEOUS at 21:11

## 2020-12-24 RX ADMIN — INSULIN LISPRO PRN UNIT: 100 INJECTION, SOLUTION INTRAVENOUS; SUBCUTANEOUS at 06:37

## 2020-12-24 RX ADMIN — INSULIN LISPRO PRN UNIT: 100 INJECTION, SOLUTION INTRAVENOUS; SUBCUTANEOUS at 21:10

## 2020-12-24 RX ADMIN — INSULIN LISPRO PRN UNIT: 100 INJECTION, SOLUTION INTRAVENOUS; SUBCUTANEOUS at 05:57

## 2020-12-24 RX ADMIN — INSULIN GLARGINE SCH MLS: 100 INJECTION, SOLUTION SUBCUTANEOUS at 10:14

## 2020-12-24 RX ADMIN — ASPIRIN SCH MG: 81 TABLET ORAL at 10:14

## 2020-12-24 NOTE — PDOC.HOSPP
- Subjective


Encounter Date: 12/24/20


Encounter Time: 11:30


Subjective: 


Patient seen and examined for respiratory failure due to COPD/CHF exacerbation. 

Requiring O2 supplementation.  Short of breath on mild-to-moderate exertion.  

Mild dry cough.  No chest pain or palpitations.





- Objective


Vital Signs & Weight: 


                             Vital Signs (12 hours)











  Temp Pulse Resp BP Pulse Ox


 


 12/24/20 18:29   81  16   93 L


 


 12/24/20 14:35   88  14  


 


 12/24/20 12:07  98.3 F  80  18  148/100 H  95


 


 12/24/20 10:21  98.0 F  82  18  161/72 H  96


 


 12/24/20 10:20      96








                                     Weight











Weight                         248 lb














I&O: 


                                        











 12/23/20 12/24/20 12/25/20





 06:59 06:59 06:59


 


Intake Total 2144 960 1450


 


Output Total 3000  3500


 


Balance -856 960 -2050











Result Diagrams: 


                                 12/24/20 04:09





                                 12/24/20 04:09


Additional Labs: 


                                   Accuchecks











  12/24/20 12/24/20 12/23/20





  18:06 05:48 20:23


 


POC Glucose  243 H  215 H  335 H








                                        





Abnormal Lab Results - Last 48 hrs





12/23/20 10:25: Chloride 96 L, Carbon Dioxide 33 H


12/24/20 04:09: Chloride 97 L, Carbon Dioxide 37 H, Serum Total Protein 5.5 L, 

Albumin 3.2 L, Globulin 2.3 L


12/24/20 04:09: RDW 16.3 H, Neutrophils % 85.1 H, Lymphocytes % 6.1 L, 

Lymphocytes # 0.4 L








EKG Reviewed by me: Yes (Sinus rhythm on telemetry)





Hospitalist ROS





- Review of Systems


Gastrointestinal: denies: nausea, vomiting, abdominal pain, diarrhea, constipat

ion, melena, hematochezia, other


Genitourinary: denies: dysuria, frequency, incontinence, hematuria, retention, 

other





- Medication


Medications: 


Active Medications











Generic Name Dose Route Start Last Admin





  Trade Name Freq  PRN Reason Stop Dose Admin


 


Acetaminophen  650 mg  12/21/20 15:09  12/24/20 18:08





  Acetaminophen 325 Mg Tab  PO   650 mg





  Q4H PRN   Administration





  Headache/Fever/Mild Pain (1-3)  


 


Albuterol/Ipratropium  3 ml  12/21/20 19:00  12/24/20 18:29





  Ipratropium/Albuterol Sulfate 3 Ml Neb  NEB   3 ml





  [id number] JAYLA   Administration


 


Aspirin  81 mg  12/24/20 09:00  12/24/20 10:14





  Aspirin 81 Mg Enteric Coated Tablet  PO   81 mg





  DAILY JAYLA   Administration


 


Budesonide  0.5 mg  12/21/20 18:30  12/24/20 18:29





  Budesonide 0.5 Mg/2 Ml Neb  NEB   0.5 mg





  BID-RT JAYLA   Administration


 


Doxycycline Hyclate  100 mg  12/23/20 21:00  12/24/20 10:14





  Doxycycline 100 Mg Cap  PO   100 mg





  BID JAYLA   Administration


 


Enoxaparin Sodium  40 mg  12/22/20 09:00  12/24/20 10:15





  Enoxaparin Sodium 40 Mg/0.4 Ml Syringe  SC   40 mg





  0900 JAYLA   Administration


 


Famotidine  20 mg  12/21/20 21:00  12/23/20 21:18





  Famotidine 20 Mg Tab  PO   20 mg





  BID JAYLA   Administration


 


Furosemide  40 mg  12/22/20 06:00  12/24/20 18:08





  Furosemide 40 Mg/4 Ml Vial  SLOW IVP   40 mg





  0600,1400 JAYLA   Administration


 


Insulin Glargine 15 units/  0.15 mls @ 0 mls/hr  12/22/20 21:00  12/24/20 10:14





  Miscellaneous Medication  SC   0.15 mls





  BID JAYLA   Administration


 


Insulin Human Lispro  0 units  12/21/20 15:11  12/24/20 18:08





  Humalog 300 Units/3 Ml Vial  SC   6 unit





  .AGGRESSIVE SLIDING  PRN   Administration





  Aggressive Correctional Scale  


 


Insulin Human Lispro  0 units  12/21/20 15:11  12/23/20 21:53





  Humalog 300 Units/3 Ml Vial  SC   4 unit





  .BEDTIME SLIDING SC PRN   Administration





  Bedtime Correctional Scale  


 


Prednisone  20 mg  12/24/20 08:00  12/24/20 10:15





  Prednisone 20 Mg Tab  PO   20 mg





  QAM-WM JAYLA   Administration














- Exam


General Appearance: awake alert


General - other findings: Patient in respiratory distress on mild exertion


Neck: no JVD


Heart: RRR, no gallops


Respiratory: no wheezes, no ronchi


Gastrointestinal: soft, non-distended


Extremities: no cyanosis, 1+ LE edema


Neurological: no new deficit


Psychiatric: normal affect, A&O x 3





Hosp A/P





- Plan


DVT proph w/SCDs





Patient is a 55-year-old female with morbid obesity, COPD with chronic 

respiratory failure on home oxygen 3 L nasal cannula especially at night and 

obstructive sleep apnea not on CPAP presented to the emergency room at [facility] in [place] with worsening shortness of breath and hypoxia with O2

sats of 57% on room air. Her O2 concentrator has stopped working. Her work-up 

was consistent with COPD/CHF exacerbation.  She was transferred to this facility

for hospital admission.  Please refer to the history and physical for further 

details.





The patient was admitted to the telemetry unit with above diagnosis.  She was 

started on steroids along with IV Lasix, O2 supplementation with nebulizer 

treatment.





Assessment: 


Acute on chronic hypoxic and hypercapnic respiratory failure


Acute on chronic diastolic heart failure


COPD exacerbation


Obstructive sleep apnea  not on CPAP


Hypertension


Morbid obesity with a BMI of 44.4


Diabetes mellitus type 2 with hyperglycemia due to steroids


Codeine allergy


Anxiety


History of pulmonary embolism


Degenerative joint disease





Plan: 


Continue IV Lasix.  Add acetazolamide due to metabolic alkalosis.  Continue 

nebulizer treatment.  Continue fluid restriction.  Continue sliding scale with 

current dose of Lantus.  Continue cardiac rehab.  DVT prophylaxis.

## 2020-12-25 LAB
ALBUMIN SERPL BCG-MCNC: 3 G/DL (ref 3.5–5)
ALP SERPL-CCNC: 50 U/L (ref 40–110)
ALT SERPL W P-5'-P-CCNC: 22 U/L (ref 8–55)
ANION GAP SERPL CALC-SCNC: 13 MMOL/L (ref 10–20)
AST SERPL-CCNC: 11 U/L (ref 5–34)
BASOPHILS # BLD AUTO: 0 THOU/UL (ref 0–0.2)
BASOPHILS NFR BLD AUTO: 0.1 % (ref 0–1)
BILIRUB SERPL-MCNC: 0.6 MG/DL (ref 0.2–1.2)
BUN SERPL-MCNC: 16 MG/DL (ref 9.8–20.1)
CALCIUM SERPL-MCNC: 8.3 MG/DL (ref 7.8–10.44)
CHLORIDE SERPL-SCNC: 99 MMOL/L (ref 98–107)
CO2 SERPL-SCNC: 33 MMOL/L (ref 22–29)
CREAT CL PREDICTED SERPL C-G-VRATE: 165 ML/MIN (ref 70–130)
EOSINOPHIL # BLD AUTO: 0.1 THOU/UL (ref 0–0.7)
EOSINOPHIL NFR BLD AUTO: 1.2 % (ref 0–10)
GLOBULIN SER CALC-MCNC: 2.2 G/DL (ref 2.4–3.5)
GLUCOSE SERPL-MCNC: 132 MG/DL (ref 70–105)
HGB BLD-MCNC: 15.6 G/DL (ref 12–16)
LYMPHOCYTES # BLD: 0.7 THOU/UL (ref 1.2–3.4)
LYMPHOCYTES NFR BLD AUTO: 15.5 % (ref 21–51)
MAGNESIUM SERPL-MCNC: 1.9 MG/DL (ref 1.6–2.6)
MCH RBC QN AUTO: 29.8 PG (ref 27–31)
MCV RBC AUTO: 89 FL (ref 78–98)
MDIFF COMPLETE?: YES
MONOCYTES # BLD AUTO: 0.4 THOU/UL (ref 0.11–0.59)
MONOCYTES NFR BLD AUTO: 9.9 % (ref 0–10)
NEUTROPHILS # BLD AUTO: 3.2 THOU/UL (ref 1.4–6.5)
NEUTROPHILS NFR BLD AUTO: 73.3 % (ref 42–75)
PLATELET # BLD AUTO: 164 THOU/UL (ref 130–400)
POTASSIUM SERPL-SCNC: 3.8 MMOL/L (ref 3.5–5.1)
RBC # BLD AUTO: 5.22 MILL/UL (ref 4.2–5.4)
SODIUM SERPL-SCNC: 141 MMOL/L (ref 136–145)
TARGETS BLD QL SMEAR: (no result) (100X)
WBC # BLD AUTO: 4.3 THOU/UL (ref 4.8–10.8)

## 2020-12-25 RX ADMIN — INSULIN LISPRO PRN UNIT: 100 INJECTION, SOLUTION INTRAVENOUS; SUBCUTANEOUS at 17:29

## 2020-12-25 RX ADMIN — INSULIN GLARGINE SCH MLS: 100 INJECTION, SOLUTION SUBCUTANEOUS at 08:26

## 2020-12-25 RX ADMIN — ASPIRIN SCH MG: 81 TABLET ORAL at 08:28

## 2020-12-25 RX ADMIN — INSULIN LISPRO PRN UNIT: 100 INJECTION, SOLUTION INTRAVENOUS; SUBCUTANEOUS at 11:51

## 2020-12-25 RX ADMIN — INSULIN GLARGINE SCH MLS: 100 INJECTION, SOLUTION SUBCUTANEOUS at 20:50

## 2020-12-25 NOTE — PDOC.HOSPP
- Subjective


Encounter Date: 12/25/20


Encounter Time: 14:15


Subjective: 


Patient seen and examined for CHF/COPD exacerbation.  Still short of breath on 

mild-to-moderate exertion.  Had tachyarrhythmia on the monitor per RN.  Denies 

any chest pain, nausea or vomiting.





- Objective


Vital Signs & Weight: 


                             Vital Signs (12 hours)











  Temp Pulse Resp BP Pulse Ox


 


 12/25/20 14:19   93  16  


 


 12/25/20 11:46  98.3 F  93  16  132/63  95


 


 12/25/20 07:34  98.2 F  82  17  133/73  94 L


 


 12/25/20 06:42   82  16  


 


 12/25/20 04:54  98.5 F  73  19  131/68  94 L








                                     Weight











Weight                         249 lb 3.2 oz














I&O: 


                                        











 12/24/20 12/25/20 12/26/20





 06:59 06:59 06:59


 


Intake Total 960 1450 1004


 


Output Total  3500 2400


 


Balance 760 -3148 -1040











Result Diagrams: 


                                 12/25/20 04:10





                                 12/25/20 04:10


Additional Labs: 


                                   Accuchecks











  12/25/20 12/25/20 12/25/20





  11:48 08:25 06:20


 


POC Glucose  242 H  113 H  102 H














  12/24/20 12/24/20





  20:48 18:06


 


POC Glucose  216 H  243 H








                                        





Abnormal Lab Results - Last 48 hrs





12/24/20 04:09: Chloride 97 L, Carbon Dioxide 37 H, Serum Total Protein 5.5 L, 

Albumin 3.2 L, Globulin 2.3 L


12/24/20 04:09: RDW 16.3 H, Neutrophils % 85.1 H, Lymphocytes % 6.1 L, 

Lymphocytes # 0.4 L


12/25/20 04:10: Carbon Dioxide 33 H, Serum Total Protein 5.2 L, Albumin 3.0 L, 

Globulin 2.2 L


12/25/20 04:10: WBC 4.3 L, RDW 16.2 H, Lymphocytes % 15.5 L, Lymphocytes # 0.7 L








EKG Reviewed by me: Yes (Sinus rhythm on telemetry)





Hospitalist ROS





- Review of Systems


Gastrointestinal: denies: nausea, vomiting, abdominal pain, diarrhea, 

constipation, melena, hematochezia, other


Genitourinary: denies: dysuria, frequency, incontinence, hematuria, retention, 

other





- Medication


Medications: 


Active Medications











Generic Name Dose Route Start Last Admin





  Trade Name Freq  PRN Reason Stop Dose Admin


 


Acetaminophen  650 mg  12/21/20 15:09  12/24/20 21:15





  Acetaminophen 325 Mg Tab  PO   650 mg





  Q4H PRN   Administration





  Headache/Fever/Mild Pain (1-3)  


 


Acetazolamide  250 mg  12/24/20 21:00  12/25/20 08:27





  Acetazolamide 250 Mg Tab  PO   250 mg





  BID JAYLA   Administration


 


Albuterol/Ipratropium  3 ml  12/21/20 19:00  12/25/20 14:19





  Ipratropium/Albuterol Sulfate 3 Ml Neb  NEB   3 ml





  [id number] JAYLA   Administration


 


Aspirin  81 mg  12/24/20 09:00  12/25/20 08:28





  Aspirin 81 Mg Enteric Coated Tablet  PO   81 mg





  DAILY JAYLA   Administration


 


Budesonide  0.5 mg  12/21/20 18:30  12/25/20 06:42





  Budesonide 0.5 Mg/2 Ml Neb  NEB   0.5 mg





  BID-RT JAYLA   Administration


 


Doxycycline Hyclate  100 mg  12/23/20 21:00  12/25/20 08:27





  Doxycycline 100 Mg Cap  PO   100 mg





  BID JAYLA   Administration


 


Enoxaparin Sodium  40 mg  12/22/20 09:00  12/25/20 08:28





  Enoxaparin Sodium 40 Mg/0.4 Ml Syringe  SC   40 mg





  0900 JAYLA   Administration


 


Famotidine  20 mg  12/21/20 21:00  12/25/20 08:28





  Famotidine 20 Mg Tab  PO   20 mg





  BID JAYLA   Administration


 


Furosemide  40 mg  12/22/20 06:00  12/25/20 14:11





  Furosemide 40 Mg/4 Ml Vial  SLOW IVP   40 mg





  0600,1400 JAYLA   Administration


 


Insulin Glargine 15 units/  0.15 mls @ 0 mls/hr  12/22/20 21:00  12/25/20 08:26





  Miscellaneous Medication  SC   0.15 mls





  BID JAYLA   Administration


 


Magnesium Sulfate 2 gm/ Device  50 mls @ 50 mls/hr  12/25/20 13:00  12/25/20 

14:11





  IVPB  12/25/20 15:00  50 mls





  NOW JAYLA   Administration


 


Insulin Human Lispro  0 units  12/21/20 15:11  12/25/20 11:51





  Humalog 300 Units/3 Ml Vial  SC   6 unit





  .AGGRESSIVE SLIDING  PRN   Administration





  Aggressive Correctional Scale  


 


Insulin Human Lispro  0 units  12/21/20 15:11  12/24/20 21:10





  Humalog 300 Units/3 Ml Vial  SC   2 unit





  .BEDTIME SLIDING SC PRN   Administration





  Bedtime Correctional Scale  


 


Prednisone  20 mg  12/24/20 08:00  12/25/20 08:27





  Prednisone 20 Mg Tab  PO   20 mg





  QAM-WM JAYLA   Administration














- Exam


General Appearance: NAD (Addressed)


Neck: supple


Heart: RRR, no gallops


Respiratory: no wheezes, rales (Rales at bases), rhonchi


Gastrointestinal: soft, non-tender, normal bowel sounds


Extremities: no cyanosis


Neurological: no new deficit


Psychiatric: normal affect, A&O x 3





Hosp A/P





- Plan


DVT proph w/SCDs





Patient is a 55-year-old female with morbid obesity, COPD with chronic 

respiratory failure on home oxygen 3 L nasal cannula especially at night and 

obstructive sleep apnea not on CPAP presented to the emergency room at [facility] in [place] with worsening shortness of breath and hypoxia with O2

sats of 57% on room air. Her O2 concentrator has stopped working. Her work-up 

was consistent with COPD/CHF exacerbation.  She was transferred to this facility

for hospital admission.  Please refer to the history and physical for further 

details.





The patient was admitted to the telemetry unit with above diagnosis.  She was 

started on steroids along with IV Lasix, O2 supplementation with nebulizer 

treatment.  Developed NSVT on 12/25.  Cardiology was consulted.





Assessment: 


Acute on chronic hypoxic and hypercapnic respiratory failure


Acute on chronic diastolic heart failure


COPD exacerbation


NSVT


Hypomagnesemia


Obstructive sleep apnea  not on CPAP


Hypertension


Morbid obesity with a BMI of 44.4


Diabetes mellitus type 2 with hyperglycemia due to steroids


Codeine allergy


Anxiety


History of pulmonary embolism


Degenerative joint disease





Plan: 


Continue IV Lasix.  Replace magnesium.  Continue fluid restriction.  Discontinue

scheduled nebulizer treatment due to tachyarrhythmia.  Continue Pulmicort.  

Taper prednisone.  Recheck labs in a.m.  Continue current dose of Lantus with 

aggressive sliding scale.

## 2020-12-26 LAB
ANION GAP SERPL CALC-SCNC: 11 MMOL/L (ref 10–20)
BUN SERPL-MCNC: 18 MG/DL (ref 9.8–20.1)
CALCIUM SERPL-MCNC: 8.6 MG/DL (ref 7.8–10.44)
CHLORIDE SERPL-SCNC: 100 MMOL/L (ref 98–107)
CO2 SERPL-SCNC: 35 MMOL/L (ref 22–29)
CREAT CL PREDICTED SERPL C-G-VRATE: 165 ML/MIN (ref 70–130)
GLUCOSE SERPL-MCNC: 146 MG/DL (ref 70–105)
MAGNESIUM SERPL-MCNC: 2.2 MG/DL (ref 1.6–2.6)
POTASSIUM SERPL-SCNC: 4.4 MMOL/L (ref 3.5–5.1)
SODIUM SERPL-SCNC: 142 MMOL/L (ref 136–145)

## 2020-12-26 RX ADMIN — INSULIN LISPRO PRN UNIT: 100 INJECTION, SOLUTION INTRAVENOUS; SUBCUTANEOUS at 06:12

## 2020-12-26 RX ADMIN — INSULIN GLARGINE SCH MLS: 100 INJECTION, SOLUTION SUBCUTANEOUS at 21:52

## 2020-12-26 RX ADMIN — INSULIN LISPRO PRN UNIT: 100 INJECTION, SOLUTION INTRAVENOUS; SUBCUTANEOUS at 11:48

## 2020-12-26 RX ADMIN — ASPIRIN SCH MG: 81 TABLET ORAL at 08:31

## 2020-12-26 RX ADMIN — INSULIN LISPRO PRN UNIT: 100 INJECTION, SOLUTION INTRAVENOUS; SUBCUTANEOUS at 17:43

## 2020-12-26 RX ADMIN — INSULIN GLARGINE SCH MLS: 100 INJECTION, SOLUTION SUBCUTANEOUS at 08:32

## 2020-12-26 NOTE — PDOC.HOSPP
- Subjective


Encounter Date: 12/26/20


Encounter Time: 12:45


Subjective: 


Patient up in bed no complaints





- Objective


Vital Signs & Weight: 


                             Vital Signs (12 hours)











  Temp Pulse Resp BP BP Pulse Ox


 


 12/26/20 12:00  98.1 F  75  18   125/93 H  96


 


 12/26/20 08:00       96


 


 12/26/20 07:33  97.9 F  88  18  122/71  


 


 12/26/20 04:28   90  16    95








                                     Weight











Weight                         244 lb 14.4 oz














I&O: 


                                        











 12/25/20 12/26/20 12/27/20





 06:59 06:59 06:59


 


Intake Total 1450 2774 


 


Output Total 3504 5618 


 


Balance -8691 -3846 











Result Diagrams: 


                                 12/25/20 04:10





                                 12/26/20 04:21


Additional Labs: 


                                   Accuchecks











  12/26/20 12/26/20 12/25/20





  10:43 05:29 20:01


 


POC Glucose  198 H  200 H  206 H














  12/25/20





  16:47


 


POC Glucose  196 H














Hospitalist ROS





- Review of Systems


Respiratory: denies: cough, dry, shortness of breath, hemoptysis, SOB with exce

rtion, pleuritic pain, sputum, wheezing, other


Cardiovascular: denies: chest pain, palpitations, orthopnea, paroxysmal noc. 

dyspnea, edema, light headedness, other


Gastrointestinal: denies: nausea, vomiting, abdominal pain, diarrhea, 

constipation, melena, hematochezia, other





- Medication


Medications: 


Active Medications











Generic Name Dose Route Start Last Admin





  Trade Name Freq  PRN Reason Stop Dose Admin


 


Acetaminophen  650 mg  12/21/20 15:09  12/25/20 20:51





  Acetaminophen 325 Mg Tab  PO   650 mg





  Q4H PRN   Administration





  Headache/Fever/Mild Pain (1-3)  


 


Aspirin  81 mg  12/24/20 09:00  12/26/20 08:31





  Aspirin 81 Mg Enteric Coated Tablet  PO   81 mg





  DAILY JAYLA   Administration


 


Budesonide  0.5 mg  12/21/20 18:30  12/26/20 04:37





  Budesonide 0.5 Mg/2 Ml Neb  NEB   0.5 mg





  BID-RT JAYLA   Administration


 


Doxycycline Hyclate  100 mg  12/23/20 21:00  12/26/20 08:31





  Doxycycline 100 Mg Cap  PO   100 mg





  BID JAYLA   Administration


 


Enoxaparin Sodium  40 mg  12/22/20 09:00  12/26/20 08:31





  Enoxaparin Sodium 40 Mg/0.4 Ml Syringe  SC   40 mg





  0900 JAYLA   Administration


 


Famotidine  20 mg  12/21/20 21:00  12/26/20 08:31





  Famotidine 20 Mg Tab  PO   20 mg





  BID JAYLA   Administration


 


Furosemide  40 mg  12/22/20 06:00  12/26/20 13:57





  Furosemide 40 Mg/4 Ml Vial  SLOW IVP   Not Given





  0600,1400 [facility]  


 


Insulin Glargine 15 units/  0.15 mls @ 0 mls/hr  12/22/20 21:00  12/26/20 08:32





  Miscellaneous Medication  SC   0.15 mls





  BID JAYLA   Administration


 


Insulin Human Lispro  0 units  12/21/20 15:11  12/26/20 11:48





  Humalog 300 Units/3 Ml Vial  SC   3 unit





  .AGGRESSIVE SLIDING  PRN   Administration





  Aggressive Correctional Scale  


 


Insulin Human Lispro  0 units  12/21/20 15:11  12/24/20 21:10





  Humalog 300 Units/3 Ml Vial  SC   2 unit





  .BEDTIME SLIDING SC PRN   Administration





  Bedtime Correctional Scale  


 


Prednisone  20 mg  12/24/20 08:00  12/26/20 08:31





  Prednisone 20 Mg Tab  PO   20 mg





  QAM-WM JAYLA   Administration














- Exam


Heart: negative: RRR, no murmur, no gallops, no rubs, normal peripheral pulses, 

irregular, diminshed peripheral pulses, murmur present, II/IV, III/IV


Respiratory: negative: CTAB, no wheezes, no rales, no ronchi, normal chest 

expansion, no tachypnea, normal percussion, rales, rhonchi, tachypneic, wheezes


Gastrointestinal: negative: soft, non-tender, non-distended, normal bowel 

sounds, no palpable masses, no hepatomegaly, no splenomegaly, no bruit, no 

guarding, no rigidity, tender to palpation, distended, diminished bowl sounds, 

voluntary guarding


Extremities: negative: no cyanosis, no clubbing, no edema, 1+ LE edema, 2+ LE 

edema, clubbing





Hosp A/P





- Plan





Patient is a 55-year-old female with morbid obesity, COPD with chronic 

respiratory failure on home oxygen 3 L nasal cannula especially at night and 

obstructive sleep apnea not on CPAP presented to the emergency room at [facility] in [place] with worsening shortness of breath and hypoxia with O2

sats of 57% on room air. Her O2 concentrator has stopped working. Her work-up 

was consistent with COPD/CHF exacerbation.  She was transferred to this facility

for hospital admission.  Please refer to the history and physical for further 

details.





The patient was admitted to the telemetry unit with above diagnosis.  She was 

started on steroids along with IV Lasix, O2 supplementation with nebulizer 

treatment.  Developed NSVT on 12/25.  Cardiology was consulted.





Assessment: 


Acute on chronic hypoxic and hypercapnic respiratory failure


Acute on chronic diastolic heart failure


COPD exacerbation


NSVT


Hypomagnesemia


Obstructive sleep apnea  not on CPAP


Hypertension


Morbid obesity with a BMI of 44.4


Diabetes mellitus type 2 with hyperglycemia due to steroids


Codeine allergy


Anxiety


History of pulmonary embolism


Degenerative joint disease





Plan: 


Continue IV Lasix.  Replace magnesium.  Continue fluid restriction.  Discontinue

scheduled nebulizer treatment due to tachyarrhythmia.  Continue Pulmicort.  

Taper prednisone.  Recheck labs in a.m.  Continue current dose of Lantus with 

aggressive sliding scale.








12/26 patient feels well possible discharge in a.m.  Cardiology needs to keep 

patient 1 more day.  Echocardiogram ordered.

## 2020-12-26 NOTE — CON
DATE OF CONSULTATION:  



CONSULTING PHYSICIAN:  Naresh Flaherty MD



HISTORY OF PRESENT ILLNESS:  The patient is a 55-year-old woman with a history 
of congestive heart failure and COPD, who presents with increasing dyspnea.  The

patient has a history of congestive heart failure.  She is  followed by a 
cardiologist in [place].  She was admitted in July of 2020 with COPD 
exacerbation.

She underwent an echocardiogram, revealed normal left ventricular ejection 
fraction of 60% to 65% with diastolic dysfunction.  The patient presented 
several days ago

with increasing dyspnea.  She denied having any chest discomfort. 



PAST MEDICAL HISTORY:  

1. Congestive heart failure.

2. COPD.

3. Pulmonary embolism.

4. Hypertension.

5. Anxiety.

6. Diabetes.



PAST SURGICAL HISTORY:  Hysterectomy, back surgery, thyroid surgery,

and cholecystectomy. 



SOCIAL HISTORY:  Long history of tobacco abuse.  She also smokes marijuana.



FAMILY HISTORY:  There is a positive family history of heart disease.



ALLERGIES:  CODEINE.



MEDICATIONS ON ADMISSION:  See nursing list.



REVIEW OF SYSTEMS:  Ten-point system otherwise unremarkable.



PHYSICAL EXAMINATION:

GENERAL:  Obese woman, in no acute distress. 

VITAL SIGNS:  Blood pressure of 125/93. 

NECK:  No jugular venous distention. 

LUNGS:  Decreased breath sounds bilateral. 

HEART:  Regular rate and rhythm.  Normal S1, S2.  No murmurs. 

ABDOMEN:  Distended. 

EXTREMITIES:  Showed mild bilateral edema. 

VASCULAR:  Radial pulses are 2+.



LABORATORY DATA:  Sodium 142, potassium 4.4, chloride 100, bicarbonate 35, BUN 
18,

creatinine 0.67.  White blood cell count 4.3, hemoglobin 15.6, hematocrit 46.5, 
and

platelets are 164.  EKG   normal sinus rhythm with a nonspecific T-wave

abnormality.  Telemetry monitor  short run of nonsustained ventricular

tachycardia. 



IMPRESSION:  

1. Congestive heart failure.

2. Chronic obstructive pulmonary disease.

3. Short nonsustained ventricular tachycardia.

4. Hypertension.

5. Diabetes mellitus.

6. Morbid obesity.

7. History of pulmonary embolus.

8. Tobacco abuse. 



This patient had a short run of nonsustained ventricular tachycardia.  She had 
an echocardiogram recently which revealed no significant left ventricular 
dysfunction.

The patient did present with recurrent congestive heart failure.  She does have 
evidence of congestive heart failure.  She was diuresed with IV Lasix.  The 
patient should be

on a low-dose of beta blocker, as  she does not appear to have severe COPD.  She
had been previously on Coreg.  I would restart this at a lower dose.  In 
addition, the

patient  should be on lipid-lowering medication.  I would recommend repeating 
her echocardiogram to make sure that her left ventricular function remains 
normal.  She

should follow up with her cardiologist as an outpatient.  I will follow this 
patient with you through her hospitalization. 







Job ID:  [id number]



[id number]

## 2020-12-27 VITALS — TEMPERATURE: 98.8 F | SYSTOLIC BLOOD PRESSURE: 122 MMHG | DIASTOLIC BLOOD PRESSURE: 78 MMHG

## 2020-12-27 RX ADMIN — INSULIN LISPRO PRN UNIT: 100 INJECTION, SOLUTION INTRAVENOUS; SUBCUTANEOUS at 06:07

## 2020-12-27 RX ADMIN — INSULIN LISPRO PRN UNIT: 100 INJECTION, SOLUTION INTRAVENOUS; SUBCUTANEOUS at 11:41

## 2020-12-27 RX ADMIN — INSULIN GLARGINE SCH MLS: 100 INJECTION, SOLUTION SUBCUTANEOUS at 09:21

## 2020-12-27 RX ADMIN — ASPIRIN SCH MG: 81 TABLET ORAL at 09:19

## 2022-12-13 ENCOUNTER — HOSPITAL ENCOUNTER (INPATIENT)
Dept: HOSPITAL 92 - ERS | Age: 57
LOS: 7 days | Discharge: HOME | DRG: 177 | End: 2022-12-20
Attending: STUDENT IN AN ORGANIZED HEALTH CARE EDUCATION/TRAINING PROGRAM | Admitting: STUDENT IN AN ORGANIZED HEALTH CARE EDUCATION/TRAINING PROGRAM
Payer: SELF-PAY

## 2022-12-13 VITALS — BODY MASS INDEX: 47.2 KG/M2

## 2022-12-13 DIAGNOSIS — Z99.81: ICD-10-CM

## 2022-12-13 DIAGNOSIS — Z79.52: ICD-10-CM

## 2022-12-13 DIAGNOSIS — J44.0: ICD-10-CM

## 2022-12-13 DIAGNOSIS — F32.A: ICD-10-CM

## 2022-12-13 DIAGNOSIS — Z90.710: ICD-10-CM

## 2022-12-13 DIAGNOSIS — Z28.311: ICD-10-CM

## 2022-12-13 DIAGNOSIS — Z87.891: ICD-10-CM

## 2022-12-13 DIAGNOSIS — Z79.899: ICD-10-CM

## 2022-12-13 DIAGNOSIS — Z79.84: ICD-10-CM

## 2022-12-13 DIAGNOSIS — J18.9: ICD-10-CM

## 2022-12-13 DIAGNOSIS — Z88.5: ICD-10-CM

## 2022-12-13 DIAGNOSIS — J96.21: ICD-10-CM

## 2022-12-13 DIAGNOSIS — J12.82: ICD-10-CM

## 2022-12-13 DIAGNOSIS — J44.1: ICD-10-CM

## 2022-12-13 DIAGNOSIS — I50.33: ICD-10-CM

## 2022-12-13 DIAGNOSIS — Z90.49: ICD-10-CM

## 2022-12-13 DIAGNOSIS — U07.1: Primary | ICD-10-CM

## 2022-12-13 DIAGNOSIS — L40.9: ICD-10-CM

## 2022-12-13 DIAGNOSIS — N39.0: ICD-10-CM

## 2022-12-13 LAB
ALBUMIN SERPL BCG-MCNC: 3.8 G/DL (ref 3.5–5)
ALP SERPL-CCNC: 80 U/L (ref 40–110)
ALT SERPL W P-5'-P-CCNC: 16 U/L (ref 8–55)
ANALYZER IN CARDIO: (no result)
ANION GAP SERPL CALC-SCNC: 13 MMOL/L (ref 10–20)
AST SERPL-CCNC: 11 U/L (ref 5–34)
BASE EXCESS STD BLDV CALC-SCNC: 6.8 MEQ/L
BASOPHILS # BLD AUTO: 0 THOU/UL (ref 0–0.2)
BASOPHILS NFR BLD AUTO: 0.4 % (ref 0–1)
BILIRUB SERPL-MCNC: 0.6 MG/DL (ref 0.2–1.2)
BUN SERPL-MCNC: 6 MG/DL (ref 9.8–20.1)
CA-I BLDV-MCNC: 1.03 MMOL/L (ref 1.16–1.32)
CALCIUM SERPL-MCNC: 8.5 MG/DL (ref 7.8–10.44)
CHLORIDE BLDV-SCNC: 101 MMOL/L (ref 98–106)
CHLORIDE SERPL-SCNC: 103 MMOL/L (ref 98–107)
CO2 SERPL-SCNC: 31 MMOL/L (ref 22–29)
CREAT CL PREDICTED SERPL C-G-VRATE: 0 ML/MIN (ref 70–130)
EOSINOPHIL # BLD AUTO: 0.1 THOU/UL (ref 0–0.7)
EOSINOPHIL NFR BLD AUTO: 1.1 % (ref 0–10)
GLOBULIN SER CALC-MCNC: 3 G/DL (ref 2.4–3.5)
GLUCOSE SERPL-MCNC: 272 MG/DL (ref 70–105)
HCO3 BLDV-SCNC: 34 MEQ/L (ref 22–28)
HCT VFR BLDV CALC: 48 % (ref 36–47)
HGB BLD-MCNC: 14.6 G/DL (ref 12–16)
HGB BLDV-MCNC: 16.3 G/DL (ref 11.7–16)
LYMPHOCYTES # BLD: 0.3 THOU/UL (ref 1.2–3.4)
LYMPHOCYTES NFR BLD AUTO: 4.2 % (ref 21–51)
MCH RBC QN AUTO: 26.9 PG (ref 27–31)
MCV RBC AUTO: 86.7 FL (ref 78–98)
MONOCYTES # BLD AUTO: 0.2 THOU/UL (ref 0.11–0.59)
MONOCYTES NFR BLD AUTO: 2.7 % (ref 0–10)
NEUTROPHILS # BLD AUTO: 7.1 THOU/UL (ref 1.4–6.5)
NEUTROPHILS NFR BLD AUTO: 91.6 % (ref 42–75)
PLATELET # BLD AUTO: 192 10X3/UL (ref 130–400)
POTASSIUM BLDV-SCNC: 3.66 MMOL/L (ref 3.7–5.3)
POTASSIUM SERPL-SCNC: 3.6 MMOL/L (ref 3.5–5.1)
RBC # BLD AUTO: 5.41 MILL/UL (ref 4.2–5.4)
SARS-COV-2 RNA RESP QL NAA+PROBE: DETECTED
SODIUM BLDV-SCNC: 142.4 MMOL/L (ref 133–146)
SODIUM SERPL-SCNC: 143 MMOL/L (ref 136–145)
TROPONIN I SERPL DL<=0.01 NG/ML-MCNC: (no result) NG/ML (ref ?–0.03)
TROPONIN I SERPL DL<=0.01 NG/ML-MCNC: (no result) NG/ML (ref ?–0.03)
WBC # BLD AUTO: 7.7 10X3/UL (ref 4.8–10.8)

## 2022-12-13 PROCEDURE — 72125 CT NECK SPINE W/O DYE: CPT

## 2022-12-13 PROCEDURE — 96376 TX/PRO/DX INJ SAME DRUG ADON: CPT

## 2022-12-13 PROCEDURE — 71275 CT ANGIOGRAPHY CHEST: CPT

## 2022-12-13 PROCEDURE — 84100 ASSAY OF PHOSPHORUS: CPT

## 2022-12-13 PROCEDURE — 84484 ASSAY OF TROPONIN QUANT: CPT

## 2022-12-13 PROCEDURE — 36416 COLLJ CAPILLARY BLOOD SPEC: CPT

## 2022-12-13 PROCEDURE — G0378 HOSPITAL OBSERVATION PER HR: HCPCS

## 2022-12-13 PROCEDURE — 85025 COMPLETE CBC W/AUTO DIFF WBC: CPT

## 2022-12-13 PROCEDURE — 83735 ASSAY OF MAGNESIUM: CPT

## 2022-12-13 PROCEDURE — 84145 PROCALCITONIN (PCT): CPT

## 2022-12-13 PROCEDURE — 81001 URINALYSIS AUTO W/SCOPE: CPT

## 2022-12-13 PROCEDURE — 83880 ASSAY OF NATRIURETIC PEPTIDE: CPT

## 2022-12-13 PROCEDURE — 80048 BASIC METABOLIC PNL TOTAL CA: CPT

## 2022-12-13 PROCEDURE — 96374 THER/PROPH/DIAG INJ IV PUSH: CPT

## 2022-12-13 PROCEDURE — 93306 TTE W/DOPPLER COMPLETE: CPT

## 2022-12-13 PROCEDURE — 71045 X-RAY EXAM CHEST 1 VIEW: CPT

## 2022-12-13 PROCEDURE — 84132 ASSAY OF SERUM POTASSIUM: CPT

## 2022-12-13 PROCEDURE — 72192 CT PELVIS W/O DYE: CPT

## 2022-12-13 PROCEDURE — 70450 CT HEAD/BRAIN W/O DYE: CPT

## 2022-12-13 PROCEDURE — 82805 BLOOD GASES W/O2 SATURATION: CPT

## 2022-12-13 PROCEDURE — 36415 COLL VENOUS BLD VENIPUNCTURE: CPT

## 2022-12-13 PROCEDURE — 96375 TX/PRO/DX INJ NEW DRUG ADDON: CPT

## 2022-12-13 PROCEDURE — 96372 THER/PROPH/DIAG INJ SC/IM: CPT

## 2022-12-13 PROCEDURE — 80053 COMPREHEN METABOLIC PANEL: CPT

## 2022-12-13 PROCEDURE — 93005 ELECTROCARDIOGRAM TRACING: CPT

## 2022-12-13 PROCEDURE — 94640 AIRWAY INHALATION TREATMENT: CPT

## 2022-12-13 RX ADMIN — INSULIN LISPRO PRN UNIT: 100 INJECTION, SOLUTION INTRAVENOUS; SUBCUTANEOUS at 22:19

## 2022-12-14 LAB
ANION GAP SERPL CALC-SCNC: 13 MMOL/L (ref 10–20)
BASOPHILS # BLD AUTO: 0 THOU/UL (ref 0–0.2)
BASOPHILS NFR BLD AUTO: 0.1 % (ref 0–1)
BUN SERPL-MCNC: 9 MG/DL (ref 9.8–20.1)
CALCIUM SERPL-MCNC: 8.5 MG/DL (ref 7.8–10.44)
CHLORIDE SERPL-SCNC: 98 MMOL/L (ref 98–107)
CO2 SERPL-SCNC: 33 MMOL/L (ref 22–29)
CREAT CL PREDICTED SERPL C-G-VRATE: 164 ML/MIN (ref 70–130)
EOSINOPHIL # BLD AUTO: 0 THOU/UL (ref 0–0.7)
EOSINOPHIL NFR BLD AUTO: 0.4 % (ref 0–10)
GLUCOSE SERPL-MCNC: 274 MG/DL (ref 70–105)
HGB BLD-MCNC: 14.1 G/DL (ref 12–16)
LYMPHOCYTES # BLD: 0.6 THOU/UL (ref 1.2–3.4)
LYMPHOCYTES NFR BLD AUTO: 10.7 % (ref 21–51)
MCH RBC QN AUTO: 28.4 PG (ref 27–31)
MCV RBC AUTO: 87.1 FL (ref 78–98)
MDIFF COMPLETE?: YES
MONOCYTES # BLD AUTO: 0.2 THOU/UL (ref 0.11–0.59)
MONOCYTES NFR BLD AUTO: 3.6 % (ref 0–10)
NEUTROPHILS # BLD AUTO: 5 THOU/UL (ref 1.4–6.5)
NEUTROPHILS NFR BLD AUTO: 85.2 % (ref 42–75)
PLATELET # BLD AUTO: 201 10X3/UL (ref 130–400)
POLYCHROMASIA BLD QL SMEAR: (no result) (100X)
POTASSIUM SERPL-SCNC: 3.7 MMOL/L (ref 3.5–5.1)
RBC # BLD AUTO: 4.97 MILL/UL (ref 4.2–5.4)
SODIUM SERPL-SCNC: 140 MMOL/L (ref 136–145)
STOMATOCYTES BLD QL SMEAR: (no result) (100X)
WBC # BLD AUTO: 5.5 10X3/UL (ref 4.8–10.8)

## 2022-12-14 PROCEDURE — 8E0ZXY6 ISOLATION: ICD-10-PCS | Performed by: STUDENT IN AN ORGANIZED HEALTH CARE EDUCATION/TRAINING PROGRAM

## 2022-12-14 RX ADMIN — ASPIRIN SCH MG: 81 TABLET ORAL at 08:17

## 2022-12-14 RX ADMIN — INSULIN LISPRO PRN UNIT: 100 INJECTION, SOLUTION INTRAVENOUS; SUBCUTANEOUS at 08:22

## 2022-12-14 RX ADMIN — ALBUTEROL SULFATE SCH: 108 INHALANT RESPIRATORY (INHALATION) at 22:12

## 2022-12-14 RX ADMIN — INSULIN LISPRO PRN UNIT: 100 INJECTION, SOLUTION INTRAVENOUS; SUBCUTANEOUS at 22:11

## 2022-12-14 RX ADMIN — Medication PRN ML: at 18:51

## 2022-12-14 RX ADMIN — ALBUTEROL SULFATE SCH PUFF: 108 INHALANT RESPIRATORY (INHALATION) at 02:56

## 2022-12-14 RX ADMIN — INSULIN LISPRO PRN UNIT: 100 INJECTION, SOLUTION INTRAVENOUS; SUBCUTANEOUS at 11:45

## 2022-12-15 LAB
ANION GAP SERPL CALC-SCNC: 12 MMOL/L (ref 10–20)
BUN SERPL-MCNC: 16 MG/DL (ref 9.8–20.1)
CALCIUM SERPL-MCNC: 8.9 MG/DL (ref 7.8–10.44)
CHLORIDE SERPL-SCNC: 97 MMOL/L (ref 98–107)
CO2 SERPL-SCNC: 35 MMOL/L (ref 22–29)
CREAT CL PREDICTED SERPL C-G-VRATE: 158 ML/MIN (ref 70–130)
GLUCOSE SERPL-MCNC: 341 MG/DL (ref 70–105)
POTASSIUM SERPL-SCNC: 4.3 MMOL/L (ref 3.5–5.1)
SODIUM SERPL-SCNC: 140 MMOL/L (ref 136–145)

## 2022-12-15 RX ADMIN — INSULIN LISPRO PRN UNIT: 100 INJECTION, SOLUTION INTRAVENOUS; SUBCUTANEOUS at 17:26

## 2022-12-15 RX ADMIN — ALBUTEROL SULFATE SCH PUFF: 108 INHALANT RESPIRATORY (INHALATION) at 00:51

## 2022-12-15 RX ADMIN — ALBUTEROL SULFATE SCH PUFF: 108 INHALANT RESPIRATORY (INHALATION) at 06:47

## 2022-12-15 RX ADMIN — INSULIN LISPRO PRN UNIT: 100 INJECTION, SOLUTION INTRAVENOUS; SUBCUTANEOUS at 06:46

## 2022-12-15 RX ADMIN — ASPIRIN SCH MG: 81 TABLET ORAL at 09:58

## 2022-12-15 RX ADMIN — INSULIN LISPRO PRN UNIT: 100 INJECTION, SOLUTION INTRAVENOUS; SUBCUTANEOUS at 20:56

## 2022-12-16 LAB
ANION GAP SERPL CALC-SCNC: 15 MMOL/L (ref 10–20)
BUN SERPL-MCNC: 17 MG/DL (ref 9.8–20.1)
CALCIUM SERPL-MCNC: 9.3 MG/DL (ref 7.8–10.44)
CHLORIDE SERPL-SCNC: 93 MMOL/L (ref 98–107)
CO2 SERPL-SCNC: 37 MMOL/L (ref 22–29)
CREAT CL PREDICTED SERPL C-G-VRATE: 167 ML/MIN (ref 70–130)
GLUCOSE SERPL-MCNC: 291 MG/DL (ref 70–105)
POTASSIUM SERPL-SCNC: 4.1 MMOL/L (ref 3.5–5.1)
SODIUM SERPL-SCNC: 141 MMOL/L (ref 136–145)

## 2022-12-16 RX ADMIN — INSULIN LISPRO PRN UNIT: 100 INJECTION, SOLUTION INTRAVENOUS; SUBCUTANEOUS at 20:25

## 2022-12-16 RX ADMIN — ASPIRIN SCH MG: 81 TABLET ORAL at 09:14

## 2022-12-16 RX ADMIN — POLYETHYLENE GLYCOL 400 AND PROPYLENE GLYCOL SCH DROP: 4; 3 GEL OPHTHALMIC at 14:32

## 2022-12-16 RX ADMIN — INSULIN LISPRO PRN UNIT: 100 INJECTION, SOLUTION INTRAVENOUS; SUBCUTANEOUS at 11:05

## 2022-12-16 RX ADMIN — POLYETHYLENE GLYCOL 400 AND PROPYLENE GLYCOL SCH DROP: 4; 3 GEL OPHTHALMIC at 20:27

## 2022-12-16 RX ADMIN — INSULIN LISPRO PRN UNIT: 100 INJECTION, SOLUTION INTRAVENOUS; SUBCUTANEOUS at 16:57

## 2022-12-17 LAB
ANION GAP SERPL CALC-SCNC: 18 MMOL/L (ref 10–20)
ANISOCYTOSIS BLD QL SMEAR: (no result) (100X)
BACTERIA UR QL AUTO: (no result) HPF
BUN SERPL-MCNC: 15 MG/DL (ref 9.8–20.1)
CALCIUM SERPL-MCNC: 9.7 MG/DL (ref 7.8–10.44)
CAUTI INDICATIONS FOR CULTURE: (no result)
CHLORIDE SERPL-SCNC: 92 MMOL/L (ref 98–107)
CO2 SERPL-SCNC: 35 MMOL/L (ref 22–29)
CREAT CL PREDICTED SERPL C-G-VRATE: 174 ML/MIN (ref 70–130)
GLUCOSE SERPL-MCNC: 265 MG/DL (ref 70–105)
GLUCOSE UR STRIP-MCNC: 300 MG/DL
HGB BLD-MCNC: 14.1 G/DL (ref 12–16)
MAGNESIUM SERPL-MCNC: 1.9 MG/DL (ref 1.6–2.6)
MCH RBC QN AUTO: 27.8 PG (ref 27–31)
MCV RBC AUTO: 88.1 FL (ref 78–98)
MDIFF COMPLETE?: YES
PLATELET # BLD AUTO: 143 10X3/UL (ref 130–400)
POTASSIUM SERPL-SCNC: 4.5 MMOL/L (ref 3.5–5.1)
RBC # BLD AUTO: 5.08 MILL/UL (ref 4.2–5.4)
RBC UR QL AUTO: (no result) HPF (ref 0–3)
SODIUM SERPL-SCNC: 140 MMOL/L (ref 136–145)
SP GR UR STRIP: 1.01 (ref 1–1.04)
WBC # BLD AUTO: 4.3 10X3/UL (ref 4.8–10.8)
WBC UR QL AUTO: (no result) HPF (ref 0–3)

## 2022-12-17 RX ADMIN — ASPIRIN SCH MG: 81 TABLET ORAL at 08:23

## 2022-12-17 RX ADMIN — INSULIN LISPRO PRN UNIT: 100 INJECTION, SOLUTION INTRAVENOUS; SUBCUTANEOUS at 12:37

## 2022-12-17 RX ADMIN — INSULIN LISPRO PRN UNIT: 100 INJECTION, SOLUTION INTRAVENOUS; SUBCUTANEOUS at 20:30

## 2022-12-17 RX ADMIN — INSULIN LISPRO PRN UNIT: 100 INJECTION, SOLUTION INTRAVENOUS; SUBCUTANEOUS at 17:44

## 2022-12-17 RX ADMIN — Medication PRN ML: at 08:24

## 2022-12-17 RX ADMIN — POLYETHYLENE GLYCOL 400 AND PROPYLENE GLYCOL SCH DROP: 4; 3 GEL OPHTHALMIC at 19:46

## 2022-12-17 RX ADMIN — POLYETHYLENE GLYCOL 400 AND PROPYLENE GLYCOL SCH DROP: 4; 3 GEL OPHTHALMIC at 08:24

## 2022-12-17 RX ADMIN — POLYETHYLENE GLYCOL 400 AND PROPYLENE GLYCOL SCH DROP: 4; 3 GEL OPHTHALMIC at 14:39

## 2022-12-17 RX ADMIN — CEFTRIAXONE SCH MLS: 1 INJECTION, POWDER, FOR SOLUTION INTRAMUSCULAR; INTRAVENOUS at 16:47

## 2022-12-18 LAB
ALBUMIN SERPL BCG-MCNC: 3.5 G/DL (ref 3.5–5)
ALP SERPL-CCNC: 63 U/L (ref 40–110)
ALT SERPL W P-5'-P-CCNC: 27 U/L (ref 8–55)
ANION GAP SERPL CALC-SCNC: 20 MMOL/L (ref 10–20)
AST SERPL-CCNC: 13 U/L (ref 5–34)
BASOPHILS # BLD AUTO: 0 THOU/UL (ref 0–0.2)
BASOPHILS NFR BLD AUTO: 0 % (ref 0–1)
BILIRUB SERPL-MCNC: 0.4 MG/DL (ref 0.2–1.2)
BUN SERPL-MCNC: 19 MG/DL (ref 9.8–20.1)
CALCIUM SERPL-MCNC: 9.5 MG/DL (ref 7.8–10.44)
CHLORIDE SERPL-SCNC: 91 MMOL/L (ref 98–107)
CO2 SERPL-SCNC: 34 MMOL/L (ref 22–29)
CREAT CL PREDICTED SERPL C-G-VRATE: 167 ML/MIN (ref 70–130)
EOSINOPHIL # BLD AUTO: 0 THOU/UL (ref 0–0.7)
EOSINOPHIL NFR BLD AUTO: 0.5 % (ref 0–10)
GLOBULIN SER CALC-MCNC: 3 G/DL (ref 2.4–3.5)
GLUCOSE SERPL-MCNC: 239 MG/DL (ref 70–105)
HGB BLD-MCNC: 14.6 G/DL (ref 12–16)
LYMPHOCYTES # BLD: 0.4 THOU/UL (ref 1.2–3.4)
LYMPHOCYTES NFR BLD AUTO: 11.9 % (ref 21–51)
MAGNESIUM SERPL-MCNC: 2 MG/DL (ref 1.6–2.6)
MCH RBC QN AUTO: 27.8 PG (ref 27–31)
MCV RBC AUTO: 88 FL (ref 78–98)
MONOCYTES # BLD AUTO: 0.2 THOU/UL (ref 0.11–0.59)
MONOCYTES NFR BLD AUTO: 6.5 % (ref 0–10)
NEUTROPHILS # BLD AUTO: 2.8 THOU/UL (ref 1.4–6.5)
NEUTROPHILS NFR BLD AUTO: 81.1 % (ref 42–75)
PLATELET # BLD AUTO: 149 10X3/UL (ref 130–400)
POTASSIUM SERPL-SCNC: 4.7 MMOL/L (ref 3.5–5.1)
RBC # BLD AUTO: 5.26 MILL/UL (ref 4.2–5.4)
SODIUM SERPL-SCNC: 140 MMOL/L (ref 136–145)
WBC # BLD AUTO: 3.4 10X3/UL (ref 4.8–10.8)

## 2022-12-18 RX ADMIN — POLYETHYLENE GLYCOL 400 AND PROPYLENE GLYCOL SCH DROP: 4; 3 GEL OPHTHALMIC at 20:18

## 2022-12-18 RX ADMIN — POLYETHYLENE GLYCOL 400 AND PROPYLENE GLYCOL SCH DROP: 4; 3 GEL OPHTHALMIC at 09:45

## 2022-12-18 RX ADMIN — CEFTRIAXONE SCH MLS: 1 INJECTION, POWDER, FOR SOLUTION INTRAMUSCULAR; INTRAVENOUS at 15:37

## 2022-12-18 RX ADMIN — ASPIRIN SCH MG: 81 TABLET ORAL at 09:46

## 2022-12-18 RX ADMIN — POLYETHYLENE GLYCOL 400 AND PROPYLENE GLYCOL SCH DROP: 4; 3 GEL OPHTHALMIC at 15:38

## 2022-12-18 RX ADMIN — INSULIN LISPRO PRN UNIT: 100 INJECTION, SOLUTION INTRAVENOUS; SUBCUTANEOUS at 11:57

## 2022-12-18 RX ADMIN — INSULIN LISPRO PRN UNIT: 100 INJECTION, SOLUTION INTRAVENOUS; SUBCUTANEOUS at 06:15

## 2022-12-19 LAB
ALBUMIN SERPL BCG-MCNC: 3.3 G/DL (ref 3.5–5)
ALP SERPL-CCNC: 59 U/L (ref 40–110)
ALT SERPL W P-5'-P-CCNC: 48 U/L (ref 8–55)
ANION GAP SERPL CALC-SCNC: 18 MMOL/L (ref 10–20)
AST SERPL-CCNC: 27 U/L (ref 5–34)
BASOPHILS # BLD AUTO: 0 THOU/UL (ref 0–0.2)
BASOPHILS NFR BLD AUTO: 0 % (ref 0–1)
BILIRUB SERPL-MCNC: 0.6 MG/DL (ref 0.2–1.2)
BUN SERPL-MCNC: 26 MG/DL (ref 9.8–20.1)
CALCIUM SERPL-MCNC: 8.9 MG/DL (ref 7.8–10.44)
CHLORIDE SERPL-SCNC: 89 MMOL/L (ref 98–107)
CO2 SERPL-SCNC: 35 MMOL/L (ref 22–29)
CREAT CL PREDICTED SERPL C-G-VRATE: 156 ML/MIN (ref 70–130)
EOSINOPHIL # BLD AUTO: 0.1 THOU/UL (ref 0–0.7)
EOSINOPHIL NFR BLD AUTO: 1.1 % (ref 0–10)
GLOBULIN SER CALC-MCNC: 2.6 G/DL (ref 2.4–3.5)
GLUCOSE SERPL-MCNC: 206 MG/DL (ref 70–105)
HGB BLD-MCNC: 13.7 G/DL (ref 12–16)
LYMPHOCYTES # BLD: 0.4 THOU/UL (ref 1.2–3.4)
LYMPHOCYTES NFR BLD AUTO: 7.4 % (ref 21–51)
MAGNESIUM SERPL-MCNC: 1.8 MG/DL (ref 1.6–2.6)
MCH RBC QN AUTO: 26.9 PG (ref 27–31)
MCV RBC AUTO: 87.5 FL (ref 78–98)
MONOCYTES # BLD AUTO: 0.3 THOU/UL (ref 0.11–0.59)
MONOCYTES NFR BLD AUTO: 5.8 % (ref 0–10)
NEUTROPHILS # BLD AUTO: 4.1 THOU/UL (ref 1.4–6.5)
NEUTROPHILS NFR BLD AUTO: 85.7 % (ref 42–75)
PLATELET # BLD AUTO: 136 10X3/UL (ref 130–400)
POTASSIUM SERPL-SCNC: 4.2 MMOL/L (ref 3.5–5.1)
RBC # BLD AUTO: 5.09 MILL/UL (ref 4.2–5.4)
SODIUM SERPL-SCNC: 138 MMOL/L (ref 136–145)
WBC # BLD AUTO: 4.8 10X3/UL (ref 4.8–10.8)

## 2022-12-19 RX ADMIN — INSULIN LISPRO PRN UNIT: 100 INJECTION, SOLUTION INTRAVENOUS; SUBCUTANEOUS at 22:43

## 2022-12-19 RX ADMIN — INSULIN LISPRO PRN UNIT: 100 INJECTION, SOLUTION INTRAVENOUS; SUBCUTANEOUS at 10:44

## 2022-12-19 RX ADMIN — POLYETHYLENE GLYCOL 400 AND PROPYLENE GLYCOL SCH DROP: 4; 3 GEL OPHTHALMIC at 10:11

## 2022-12-19 RX ADMIN — POLYETHYLENE GLYCOL 400 AND PROPYLENE GLYCOL SCH DROP: 4; 3 GEL OPHTHALMIC at 22:39

## 2022-12-19 RX ADMIN — POLYETHYLENE GLYCOL 400 AND PROPYLENE GLYCOL SCH DROP: 4; 3 GEL OPHTHALMIC at 15:35

## 2022-12-19 RX ADMIN — ASPIRIN SCH MG: 81 TABLET ORAL at 10:12

## 2022-12-20 VITALS — DIASTOLIC BLOOD PRESSURE: 61 MMHG | TEMPERATURE: 98.4 F | SYSTOLIC BLOOD PRESSURE: 99 MMHG

## 2022-12-20 LAB
ALBUMIN SERPL BCG-MCNC: 3.4 G/DL (ref 3.5–5)
ALP SERPL-CCNC: 58 U/L (ref 40–110)
ALT SERPL W P-5'-P-CCNC: 39 U/L (ref 8–55)
ANION GAP SERPL CALC-SCNC: 12 MMOL/L (ref 10–20)
AST SERPL-CCNC: 16 U/L (ref 5–34)
BASOPHILS # BLD AUTO: 0 THOU/UL (ref 0–0.2)
BASOPHILS NFR BLD AUTO: 0 % (ref 0–1)
BILIRUB SERPL-MCNC: 0.9 MG/DL (ref 0.2–1.2)
BUN SERPL-MCNC: 15 MG/DL (ref 9.8–20.1)
CALCIUM SERPL-MCNC: 9.3 MG/DL (ref 7.8–10.44)
CHLORIDE SERPL-SCNC: 92 MMOL/L (ref 98–107)
CO2 SERPL-SCNC: 37 MMOL/L (ref 22–29)
CREAT CL PREDICTED SERPL C-G-VRATE: 180 ML/MIN (ref 70–130)
EOSINOPHIL # BLD AUTO: 0 THOU/UL (ref 0–0.7)
EOSINOPHIL NFR BLD AUTO: 0.9 % (ref 0–10)
GLOBULIN SER CALC-MCNC: 3 G/DL (ref 2.4–3.5)
GLUCOSE SERPL-MCNC: 155 MG/DL (ref 70–105)
HGB BLD-MCNC: 14.4 G/DL (ref 12–16)
LYMPHOCYTES # BLD: 0.6 THOU/UL (ref 1.2–3.4)
LYMPHOCYTES NFR BLD AUTO: 12.9 % (ref 21–51)
MAGNESIUM SERPL-MCNC: 1.7 MG/DL (ref 1.6–2.6)
MCH RBC QN AUTO: 27.6 PG (ref 27–31)
MCV RBC AUTO: 85.4 FL (ref 78–98)
MONOCYTES # BLD AUTO: 0.2 THOU/UL (ref 0.11–0.59)
MONOCYTES NFR BLD AUTO: 4.1 % (ref 0–10)
NEUTROPHILS # BLD AUTO: 3.6 THOU/UL (ref 1.4–6.5)
NEUTROPHILS NFR BLD AUTO: 82.1 % (ref 42–75)
PLATELET # BLD AUTO: 122 10X3/UL (ref 130–400)
POTASSIUM SERPL-SCNC: 4.2 MMOL/L (ref 3.5–5.1)
RBC # BLD AUTO: 5.22 MILL/UL (ref 4.2–5.4)
SODIUM SERPL-SCNC: 137 MMOL/L (ref 136–145)
WBC # BLD AUTO: 4.4 10X3/UL (ref 4.8–10.8)

## 2022-12-20 RX ADMIN — ASPIRIN SCH MG: 81 TABLET ORAL at 09:24

## 2022-12-20 RX ADMIN — POLYETHYLENE GLYCOL 400 AND PROPYLENE GLYCOL SCH DROP: 4; 3 GEL OPHTHALMIC at 09:25
